# Patient Record
Sex: FEMALE | Race: BLACK OR AFRICAN AMERICAN
[De-identification: names, ages, dates, MRNs, and addresses within clinical notes are randomized per-mention and may not be internally consistent; named-entity substitution may affect disease eponyms.]

---

## 2019-06-28 ENCOUNTER — HOSPITAL ENCOUNTER (INPATIENT)
Dept: HOSPITAL 74 - JER | Age: 54
LOS: 2 days | Discharge: ANOTHER HOSPITAL | DRG: 142 | End: 2019-06-30
Attending: FAMILY MEDICINE | Admitting: FAMILY MEDICINE
Payer: COMMERCIAL

## 2019-06-28 VITALS — BODY MASS INDEX: 33.6 KG/M2

## 2019-06-28 DIAGNOSIS — I50.31: ICD-10-CM

## 2019-06-28 DIAGNOSIS — E11.9: ICD-10-CM

## 2019-06-28 DIAGNOSIS — I25.10: ICD-10-CM

## 2019-06-28 DIAGNOSIS — Z98.61: ICD-10-CM

## 2019-06-28 DIAGNOSIS — I05.0: ICD-10-CM

## 2019-06-28 DIAGNOSIS — D86.85: Primary | ICD-10-CM

## 2019-06-28 DIAGNOSIS — I48.0: ICD-10-CM

## 2019-06-28 DIAGNOSIS — D50.9: ICD-10-CM

## 2019-06-28 DIAGNOSIS — J44.9: ICD-10-CM

## 2019-06-28 DIAGNOSIS — I11.0: ICD-10-CM

## 2019-06-28 LAB
ALBUMIN SERPL-MCNC: 3.7 G/DL (ref 3.4–5)
ALP SERPL-CCNC: 74 U/L (ref 45–117)
ALT SERPL-CCNC: 32 U/L (ref 13–61)
ANION GAP SERPL CALC-SCNC: 8 MMOL/L (ref 8–16)
ANISOCYTOSIS BLD QL: (no result)
APTT BLD: 47.9 SECONDS (ref 25.2–36.5)
AST SERPL-CCNC: 23 U/L (ref 15–37)
BASOPHILS # BLD: 1.5 % (ref 0–2)
BILIRUB SERPL-MCNC: 0.7 MG/DL (ref 0.2–1)
BUN SERPL-MCNC: 10.8 MG/DL (ref 7–18)
CALCIUM SERPL-MCNC: 8.9 MG/DL (ref 8.5–10.1)
CHLORIDE SERPL-SCNC: 109 MMOL/L (ref 98–107)
CO2 SERPL-SCNC: 26 MMOL/L (ref 21–32)
CREAT SERPL-MCNC: 0.9 MG/DL (ref 0.55–1.3)
DEPRECATED RDW RBC AUTO: 24.2 % (ref 11.6–15.6)
EOSINOPHIL # BLD: 1.4 % (ref 0–4.5)
GLUCOSE SERPL-MCNC: 107 MG/DL (ref 74–106)
HCT VFR BLD CALC: 29.2 % (ref 32.4–45.2)
HGB BLD-MCNC: 8.9 GM/DL (ref 10.7–15.3)
INR BLD: 3.17 (ref 0.83–1.09)
LYMPHOCYTES # BLD: 8.1 % (ref 8–40)
MACROCYTES BLD QL: 0
MCH RBC QN AUTO: 19.2 PG (ref 25.7–33.7)
MCHC RBC AUTO-ENTMCNC: 30.4 G/DL (ref 32–36)
MCV RBC: 63.3 FL (ref 80–96)
MONOCYTES # BLD AUTO: 13.3 % (ref 3.8–10.2)
NEUTROPHILS # BLD: 75.7 % (ref 42.8–82.8)
PLATELET # BLD AUTO: 368 K/MM3 (ref 134–434)
PLATELET BLD QL SMEAR: NORMAL
PMV BLD: 8.5 FL (ref 7.5–11.1)
POTASSIUM SERPLBLD-SCNC: 4.3 MMOL/L (ref 3.5–5.1)
PROT SERPL-MCNC: 7.7 G/DL (ref 6.4–8.2)
PT PNL PPP: 37.8 SEC (ref 9.7–13)
RBC # BLD AUTO: 4.61 M/MM3 (ref 3.6–5.2)
SODIUM SERPL-SCNC: 142 MMOL/L (ref 136–145)
TARGETS BLD QL SMEAR: (no result)
WBC # BLD AUTO: 10.5 K/MM3 (ref 4–10)

## 2019-06-28 RX ADMIN — VARENICLINE TARTRATE SCH MG: 1 TABLET, FILM COATED ORAL at 23:38

## 2019-06-28 NOTE — PDOC
Rapid Medical Evaluation


Time Seen by Provider: 06/28/19 12:24


Medical Evaluation: 





06/28/19 12:24


I have performed a brief in-person evaluation of this patient.





The patient presents with a chief complaint of: short of breath


 


Pertinent physical exam findings:stable and in NAD, non-focal





I have ordered the following:labs, ekg





The patient will proceed to the ED for further evaluation.

## 2019-06-28 NOTE — PDOC
Documentation entered by Janeth Hoover SCRIBE, acting as scribe for 

Trena Knott MD.








Trena Knott MD:  This documentation has been prepared by the Sneha keating Mackenzie, SCRIBE, under my direction and personally reviewed by me 

in its entirety.  I confirm that the documentation accurately reflects all work

, treatment, procedures, and medical decision making performed by me.  





Attending Attestation





- Resident


Resident Name: Rudolph Sorto





- ED Attending Attestation


I have performed the following: I have examined & evaluated the patient, The 

case was reviewed & discussed with the resident, I agree w/resident's findings 

& plan, Exceptions are as noted





- HPI


HPI: 


The patient is a 54 year old female, with a significant PMH of CHF, HTN, DM, 

recent pneumonia (diagnosed 3 weeks ago), and 5 lymph nodes in her lungs, who 

presents to the emergency department with progressively worsening SOB and chest 

pain for 2 days. Patient reports she had been on at home oxygen treatments 

since being diagnosed with pneumonia 3 weeks ago. Last night however, despite 

administering her oxygen she had extreme difficulty breathing, sharp chest pain

, and a cough that kept her up all night. Patient also notes she has been told 

she needs an MVR. 





The patient denies headache and dizziness.


Denies fever, chills, nausea, vomiting, diarrhea and constipation.


Denies dysuria, frequency, urgency and hematuria.





Allergies: As per resident note


Past surgical history: As per resident note


Social history: None reported


PCP: Dr. Susy Luevano








- Physicial Exam


PE: 





GENERAL: Awake, alert, and fully oriented, in no acute distress


HEAD: No signs of trauma


EYES: PERRLA, EOMI, sclera anicteric, conjunctiva clear


ENT: Auricles normal inspection, hearing grossly normal, nares patent, 

oropharynx clear without exudates. Moist mucosa


NECK: Normal ROM, supple, no lymphadenopathy, JVD, or masses


LUNGS: Dec air entry at bases B/L


HEART: Regular rate and rhythm, normal S1 and S2, no murmurs, rubs or gallops


ABDOMEN: Soft, nontender, normoactive bowel sounds.  No guarding, no rebound.  

No masses


EXTREMITIES: Normal range of motion, no edema.  No clubbing or cyanosis. No 

cords, erythema, or tenderness


NEUROLOGICAL: Cranial nerves II through XII grossly intact.  Normal speech. 

Motor and sensation intact


SKIN: Warm, Dry, normal turgor, no rashes or lesions noted. 








- Medical Decision Making





Pt with history of rheumatic heart disease presenting with symptoms of heart 

failure. She is in the process of planning valve replacement, is supposed to 

get a cath at [facility] in the next few weeks. Case d/w her cardiologist, 

recommended lasix, then possibly transfer to [facility] as an inpatient if she needs 

the cath more urgently.

## 2019-06-28 NOTE — PDOC
History of Present Illness





- General


Chief Complaint: Shortness of Breath


Stated Complaint: DIFF BREATHING


Time Seen by Provider: 06/28/19 12:24





- History of Present Illness


Initial Comments: 





06/28/19 13:47


The patient is a 54 year old female with a history of HTN, HLD, DM, COPD, CHF, 

Bronchitis, who presents for evaluation of shortness of breath and chest pain.  

The patient reports a 2 day history of worsening shortness of breath worse with 

lying flat at night prompting her presentation to the ED for further 

evaluation.  She also notes intermittent bilateral poorly described chest pain.

  She notes that she was seen at [facility] 1 month ago and diagnosed with a 

pneumonia after a chest CTA.  She otherwise denies fevers, chills, nausea, 

vomiting, abdominal pain, or changes with urination or bowel movements.





Past History





- Past Medical History


Allergies/Adverse Reactions: 


 Allergies











Allergy/AdvReac Type Severity Reaction Status Date / Time


 


doxycycline Allergy   Verified 06/28/19 18:11


 


erythromycin base Allergy   Verified 06/28/19 18:11


 


Penicillins Allergy   Verified 06/28/19 18:11


 


tetracycline Allergy   Verified 06/28/19 18:11











Home Medications: 


Ambulatory Orders





Aspirin [ASA -] 81 mg PO DAILY 06/28/19 


Atorvastatin Ca [Lipitor] 40 mg PO HS 06/28/19 


Diazepam [Valium] 5 mg PO ONCE 06/28/19 


Gabapentin 300 mg PO DAILY 06/28/19 


Metoprolol Succinate 25 mg PO DAILY 06/28/19 


Omeprazole 20 mg PO DAILY 06/28/19 


Paroxetine HCl [Paxil -] 10 mg PO DAILY 06/28/19 


Sotalol HCl [Betapace AF] 80 mg PO DAILY 06/28/19 


Tiotropium Br/Olodaterol HCl [Stiolto Respimat Inhal Spray] 2.5 puff PO DAILY 06 /28/19 


Varenicline Tartrate [Chantix -] 10 mg PO DAILY 06/28/19 


Warfarin Sodium [Coumadin] 10 mg PO DAILY 06/28/19 











- Suicide/Smoking/Psychosocial Hx


Smoking History: Never smoked


Have you smoked in the past 12 months: No


Information on smoking cessation initiated: No


Hx Alcohol Use: No


Drug/Substance Use Hx: No





**Review of Systems





- Review of Systems


Comments:: 





06/28/19 13:55


Constitutional: No fevers, chills, fatigue, malaise


HEENT: No Rhinorrhea, nasal congestion, visual changes


Cardiovascular: Chest pain, Lightheadedness. No syncope, palpitations, 


Respiratory: SOB.  No Cough, Hemoptysis,


Gastrointestinal: No Abdominal pain, Nausea, Vomiting, Constipation, Diarrhea, 

Melena


Genitourinary: No Dysuria, Frequency, Urgency, Hesitancy, Hematuria, Flank pain


Musculoskeletal: No Myalgia, arthralgia


Skin: No rashes, itching, bruising, pallor


Neurologic: No Headache, Dizziness, Numbness, Weakness, or Tingling


Psychiatric: No Hallucinations. No SI or HI








*Physical Exam





- Vital Signs


 Last Vital Signs











Temp Pulse Resp BP Pulse Ox


 


 98.2 F   71   16   155/85   100 


 


 06/28/19 12:26  06/28/19 12:26  06/28/19 12:26  06/28/19 12:26  06/28/19 12:26














- Physical Exam


Comments: 





06/28/19 13:56


General Appearance: Nourished. No Apparent Distress


HEENT:  No Pharyngeal Erythema, Tonsillar Exudate, Tonsillar Erythema


Neck: No Cervical Lymphadenopathy


Respiratory/Chest: Decreased breath sounds at the bases bilaterally.  No 

Crackles, Rales, Rhonchi, Wheezing


Cardiovascular: Regular Rhythm, Regular Rate. Systolic murmur noted on exam.  

No Gallops, Rubs


Gastrointestinal/Abdominal: Normal Bowel Sounds, Soft. No Guarding, Rebound, 

Tenderness


Musculoskeletal: No CVA Tenderness


Extremity: Normal Capillary Refill


Integumentary: Normal Color, Dry, Warm


Neurologic: Fully Oriented, Alert, Normal Mood/Affect, Normal Response,








ED Treatment Course





- LABORATORY


CBC & Chemistry Diagram: 


 06/28/19 12:54





 06/28/19 12:27





Medical Decision Making





- Medical Decision Making





06/28/19 13:57


The patient is a 54 year old female with a history of HTN, HLD, DM, COPD, CHF, 

Bronchitis, who presents for evaluation of shortness of breath and chest pain.  

Differential includes but is not limited to: COPD, CHF, ACS, Infectious, 

Metabolic Derangement.  Given the patient's history and physical exam, we will 

obtain a cbc, cmp, troponin, bnp, coags, chest plain film, ekg to evaluate 

further.  We will continue to monitor and reassess while here in the ED.





06/28/19 16:50


CBC, cmp are unremarkable.  Troponin is unremarkable.  BNP is elevated to 

2000s.  Chest plain film demonstrates pulmonary congestion.  We discussed the 

case with the patient's cardiologist Dr. Stover who recommended admission for 

diuriesis.  We discussed the case with the covering cardiologist. Dr. Russell 

who has come to evaluate the patient.  We discussed the case with the admitting 

team who accepted the patient for admission.  We will treat the patient with 

lasix here in the ED.





*DC/Admit/Observation/Transfer


Diagnosis at time of Disposition: 


 SOB (shortness of breath)





CHF (congestive heart failure)


Qualifiers:


 Heart failure type: unspecified Heart failure chronicity: unspecified 

Qualified Code(s): I50.9 - Heart failure, unspecified








- Discharge Dispostion


Condition at time of disposition: Stable


Decision to Admit order: Yes





- Referrals





- Patient Instructions





- Post Discharge Activity

## 2019-06-28 NOTE — CON.CARD
Cardiology Consult (text)





- Consultation


Consultation Note: 





cc: sob





hpi:  54 f hx htn, hld, dchf, copd, cad s/p pci 2016, pafib, dm, mitral 

stenosis here with sob.  Pt was recently diagnosed with sev rheumatic MS with 

plans for outpt cath/valve surgery but over past several days has noticed worse 

voss.  Also with orthopnea and pnd.  No anginal cp, palps, dizzy, loc, le edema.

  In ER found with chf.  Sees dr vidal for cardio.





pmh: per hpi


psh: pci


social:  no tob


fam: no premature cad, scd


ros: per hpi; all others normal


meds:


 Current Medications











Generic Name Dose Route Start Last Admin





  Trade Name Freq  PRN Reason Stop Dose Admin


 


Furosemide  40 mg  06/29/19 10:00  





  Lasix Injection -  IVPUSH   





  DAILY RICHA   





     





     





     





     








pe:


 Vital Signs











 Period  Temp  Pulse  Resp  BP Sys/Cruz  Pulse Ox


 


 Last 24 Hr  98.2 F-98.2 F  70-71  16-16  155-155/85-85  








nad no jvd


rrr s1s2 no mrg


scattered rhonchi, nl eff


aaox3


no le e/c/c


abd nt nd pos bs


no jaundice diaphoresis


pos dp pt no carotid bruits





 Laboratory Last Values











WBC  10.5 K/mm3 (4.0-10.0)  H  06/28/19  12:54    


 


RBC  4.61 M/mm3 (3.60-5.2)   06/28/19  12:54    


 


Hgb  8.9 GM/dL (10.7-15.3)  L  06/28/19  12:54    


 


Hct  29.2 % (32.4-45.2)  L  06/28/19  12:54    


 


MCV  63.3 fl (80-96)  L  06/28/19  12:54    


 


MCH  19.2 pg (25.7-33.7)  L  06/28/19  12:54    


 


MCHC  30.4 g/dl (32.0-36.0)  L  06/28/19  12:54    


 


RDW  24.2 % (11.6-15.6)  H  06/28/19  12:54    


 


Plt Count  368 K/MM3 (134-434)   06/28/19  12:54    


 


MPV  8.5 fl (7.5-11.1)   06/28/19  12:54    


 


Absolute Neuts (auto)  7.9 K/mm3 (1.5-8.0)   06/28/19  12:54    


 


Neutrophils %  75.7 % (42.8-82.8)   06/28/19  12:54    


 


Neutrophils % (Manual)  55.2 % (42.8-82.8)   06/28/19  12:54    


 


Band Neutrophils %  0.0 %  06/28/19  12:54    


 


Lymphocytes %  8.1 % (8-40)   06/28/19  12:54    


 


Lymphocytes % (Manual)  34.4 % (8-40)   06/28/19  12:54    


 


Monocytes %  13.3 % (3.8-10.2)  H  06/28/19  12:54    


 


Monocytes % (Manual)  8 % (3.8-10.2)   06/28/19  12:54    


 


Eosinophils %  1.4 % (0-4.5)   06/28/19  12:54    


 


Eosinophils % (Manual)  1.1 % (0-4.5)   06/28/19  12:54    


 


Basophils %  1.5 % (0-2.0)   06/28/19  12:54    


 


Basophils % (Manual)  1.0 % (0-2.0)   06/28/19  12:54    


 


Myelocytes % (Man)  0 % (0-2)   06/28/19  12:54    


 


Promyelocytes % (Man)  0 % (0-2)   06/28/19  12:54    


 


Blast Cells % (Manual)  0 % (0-0)   06/28/19  12:54    


 


Nucleated RBC %  0 % (0-0)   06/28/19  12:54    


 


Metamyelocytes  0 % (0-2)   06/28/19  12:54    


 


Platelet Estimate  Normal   06/28/19  12:54    


 


Polychromasia  1+   06/28/19  12:54    


 


Poikilocytosis  1+   06/28/19  12:54    


 


Anisocytosis  2+   06/28/19  12:54    


 


Microcytosis  2+   06/28/19  12:54    


 


Macrocytosis  0   06/28/19  12:54    


 


Target Cells  1+   06/28/19  12:54    


 


Schistocytes  1+   06/28/19  12:54    


 


PT with INR  37.80 SEC (9.7-13.0)  H  06/28/19  12:54    


 


INR  3.17  (0.83-1.09)  H  06/28/19  12:54    


 


PTT (Actin FS)  47.9 SECONDS (25.2-36.5)  H  06/28/19  12:54    


 


Sodium  142 mmol/L (136-145)   06/28/19  12:27    


 


Potassium  4.3 mmol/L (3.5-5.1)   06/28/19  12:27    


 


Chloride  109 mmol/L ()  H  06/28/19  12:27    


 


Carbon Dioxide  26 mmol/L (21-32)   06/28/19  12:27    


 


Anion Gap  8 MMOL/L (8-16)   06/28/19  12:27    


 


BUN  10.8 mg/dL (7-18)   06/28/19  12:27    


 


Creatinine  0.9 mg/dL (0.55-1.3)   06/28/19  12:27    


 


Est GFR (CKD-EPI)AfAm  85.20   06/28/19  12:27    


 


Est GFR (CKD-EPI)NonAf  73.51   06/28/19  12:27    


 


Random Glucose  107 mg/dL ()  H  06/28/19  12:27    


 


Calcium  8.9 mg/dL (8.5-10.1)   06/28/19  12:27    


 


Total Bilirubin  0.7 mg/dL (0.2-1)   06/28/19  12:27    


 


AST  23 U/L (15-37)   06/28/19  12:27    


 


ALT  32 U/L (13-61)   06/28/19  12:27    


 


Alkaline Phosphatase  74 U/L ()   06/28/19  12:27    


 


Creatine Kinase  141 U/L ()   06/28/19  12:27    


 


Troponin I  < 0.02 ng/ml (0.00-0.05)   06/28/19  12:27    


 


B-Natriuretic Peptide  2014.1 pg/ml (5-125)  H  06/28/19  12:27    


 


Total Protein  7.7 g/dl (6.4-8.2)   06/28/19  12:27    


 


Albumin  3.7 g/dl (3.4-5.0)   06/28/19  12:27    








ecg: sr, no ischemic changes





cxr: chf








a/p:  54 f hx htn, hld, dchf, copd, cad s/p pci 2016, pafib, dm, mitral 

stenosis here with sob.





sob, acute diastolic chf, mitral stenosis:


-Pt was recently diagnosed with sev rheumatic MS with plans for outpt cath/

valve surgery but now presents with chf


-iv lasix 40 qd to start, daily chem7/weight


-d/[facility] p[facility] when resp status improved for cath and mitral valve eval





htn:


-cont home meds





cad s/p pci:


-no signs acs, continue rivera on tele


-cont ac





pafib:


-in sr now


-on coumadin, would hold for now and start hep gtt when INR<2 in preparation 

for cath/mv surgery at [facility]

## 2019-06-28 NOTE — CON.PULM
Consult


Consult Specialty:: PULMONARY


Referred by:: LANIE


Reason for Consultation:: SOB





- History of Present Illness


Chief Complaint: SOB/KEARNEY


History of Present Illness: 





54 AA FEMALE ADMITTED WITH KEARNEY OVER PAST 1-2 DAYS. SHE HAS A H/O RH MITRAL 

STENOSIS/CHF/COPD/ACTIVE SMOKING/PCI STENT 2016


HTM/PAF/DM/HPL STATES SHE HAD A 'CLOT " AND IS ON COUMADIN. SHE WASN'T SURE IF 

THE CLOT WAS IN THE LUNG OR LOWER EXT.


RECENT CTA CHEST DONE AT [facility] WAS SIGNIFICANT FOR MEDIASTINAL 

AND HILAR ADENOPATHY AND A MOSAIC PATTERN WHICH WAS THOUGHT TO BE DUE TO VOLUME 

OVERLOAD. SHE WAS WORKED UP RECENTLY AT [facility] AND HER MEDICAL TEAM IS THER 

BUT DECIDED TO COME TO [facility] .


DENIES CHEST PAIN/COUGH OR FEVER.





- History Source


History Provided By: Patient, Medical Record


Limitations to Obtaining History: No Limitations





- Past Medical History


CNS: No: Alzheimer's, CVA, Dementia, Migraine, Multiple Sclerosis, Peripheral 

Neuropathy, Parkinson's, Seizure, Syncope, TIA, Vertigo, Other


Cardio/Vascular: Yes: AFIB, CAD, CHF, HTN, Hyperlipdemia, Mitral Stenosis, 

Murmur


Pulmonary: Yes: COPD


Gastrointestinal: No: Ascites, Cancer, Constipation, Crohn's Disease, 

Diverticulitis, Diverticulosis, Esophageal Varices, Gastritis, GERD, GI Bleed, 

Hemorrhoids, Hiatal Hernia, Inflamatory Bowel Disease, Irritable Bowel Disease, 

Pancreatitis, Peptic Ulcer Disease, Ulcerative Colitis, Other


Hepatobiliary: No: Cirrhosis, Cholelithiasis, Cholecystitis, Choledocholithiasis

, Hepatitis A, Hepatitis B, Hepatitis C, Other


Reproductive: Yes: Postmenopausal


Heme/Onc: Yes: Anemia (PCI STENT)





- Alcohol/Substance Use


Hx Alcohol Use: No





- Smoking History


Smoking history: Never smoked


Have you smoked in the past 12 months: No





- Social History


ADL: Independent


Place of Birth: [place]


History of Recent Travel: Yes





Home Medications





- Allergies


Allergies/Adverse Reactions: 


 Allergies











Allergy/AdvReac Type Severity Reaction Status Date / Time


 


doxycycline Allergy   Verified 06/28/19 18:11


 


erythromycin base Allergy   Verified 06/28/19 18:11


 


Penicillins Allergy   Verified 06/28/19 18:11


 


tetracycline Allergy   Verified 06/28/19 18:11














- Home Medications


Home Medications: 


Ambulatory Orders





Aspirin [ASA -] 81 mg PO DAILY 06/28/19 


Atorvastatin Ca [Lipitor] 40 mg PO HS 06/28/19 


Diazepam [Valium] 5 mg PO ONCE 06/28/19 


Gabapentin 300 mg PO DAILY 06/28/19 


Metoprolol Succinate 25 mg PO DAILY 06/28/19 


Omeprazole 20 mg PO DAILY 06/28/19 


Paroxetine HCl [Paxil -] 10 mg PO DAILY 06/28/19 


Sotalol HCl [Betapace AF] 80 mg PO DAILY 06/28/19 


Tiotropium Br/Olodaterol HCl [Stiolto Respimat Inhal Spray] 2.5 puff PO DAILY 06 /28/19 


Varenicline Tartrate [Chantix -] 10 mg PO DAILY 06/28/19 


Warfarin Sodium [Coumadin] 10 mg PO DAILY 06/28/19 











Review of Systems





- Review of Systems


Cardiovascular: reports: Shortness of Breath


Respiratory: reports: Exercise Intolerance, Orthopnea, SOB on Exertion


Gastrointestinal: reports: No Symptoms


Genitourinary: reports: No Symptoms


Breasts: reports: No Symptoms Reported





Physical Exam


Vital Sings: 


 Vital Signs











Temperature  98.2 F   06/28/19 12:26


 


Pulse Rate  71   06/28/19 12:26


 


Respiratory Rate  16   06/28/19 12:26


 


Blood Pressure  155/85   06/28/19 12:26


 


O2 Sat by Pulse Oximetry (%)  100   06/28/19 12:26











Constitutional: Yes: Calm


Eyes: Yes: EOM Intact


HENT: Yes: Normocephalic


Neck: Yes: Trachea Midline


Cardiovascular: Yes: Regular Rate and Rhythm, S1, S2


Respiratory: Yes: CTA Bilaterally


Gastrointestinal: Yes: Normal Bowel Sounds


Edema: No


Neurological: Yes: Alert


Psychiatric: Yes: Alert


Labs: 


 CBC, BMP





 06/28/19 12:54 





 06/28/19 12:27 





REST REVIEWED





Imaging





- Results


X-ray: Report Reviewed, Image Reviewed


EKG: Report Reviewed





Problem List





- Problems


(1) CHF (congestive heart failure)


Code(s): I50.9 - HEART FAILURE, UNSPECIFIED   


Qualifiers: 


   Heart failure type: unspecified   Heart failure chronicity: unspecified   

Qualified Code(s): I50.9 - Heart failure, unspecified   





(2) SOB (shortness of breath)


Code(s): R06.02 - SHORTNESS OF BREATH   





Assessment/Plan





54 AA WITH CHF/MITRAL STENOSIS/COPD/HTN/HPL/PAF/PCI STENT/ ?VTE IN PAST/ON 

WARFARIN


PRESENTS WITH WORSENING HEART FAILURE


OF NOTE IS HILAR AND MEDIASTINAL ADENOPATHY SEEN ON RECENT CHEST IMAGING OF 

UNKNOWN ETIOLOGY ? SARCOID ? SARCOID MYOCARDITIS


PATIENT IS PLANNED FOR A RIGHT/LEFT HEART CATH AND POSSIBLE VALVE REPAIR


CONTINUE CURRENT TREATMENT PLAN AS PER CARDIOLOGY


TRY TO OBTAIN WORKUP ALREADY DONE AS NOT TO DUPLICATE TESTING





WILL FOLLOW





SOMMER BOLDEN MD

## 2019-06-29 LAB
ALBUMIN SERPL-MCNC: 3.4 G/DL (ref 3.4–5)
ALP SERPL-CCNC: 69 U/L (ref 45–117)
ALT SERPL-CCNC: 27 U/L (ref 13–61)
ANION GAP SERPL CALC-SCNC: 7 MMOL/L (ref 8–16)
ANISOCYTOSIS BLD QL: (no result)
AST SERPL-CCNC: 15 U/L (ref 15–37)
BILIRUB SERPL-MCNC: 0.6 MG/DL (ref 0.2–1)
BNP SERPL-MCNC: 800.1 PG/ML (ref 5–125)
BUN SERPL-MCNC: 12 MG/DL (ref 7–18)
CALCIUM SERPL-MCNC: 9.1 MG/DL (ref 8.5–10.1)
CHLORIDE SERPL-SCNC: 108 MMOL/L (ref 98–107)
CO2 SERPL-SCNC: 27 MMOL/L (ref 21–32)
CREAT SERPL-MCNC: 0.8 MG/DL (ref 0.55–1.3)
DACRYOCYTES BLD QL SMEAR: (no result)
DEPRECATED RDW RBC AUTO: 24.4 % (ref 11.6–15.6)
GLUCOSE SERPL-MCNC: 93 MG/DL (ref 74–106)
HCT VFR BLD CALC: 27.7 % (ref 32.4–45.2)
HGB BLD-MCNC: 8.4 GM/DL (ref 10.7–15.3)
INR BLD: 3.38 (ref 0.83–1.09)
MACROCYTES BLD QL: (no result)
MAGNESIUM SERPL-MCNC: 1.9 MG/DL (ref 1.8–2.4)
MCH RBC QN AUTO: 19.1 PG (ref 25.7–33.7)
MCHC RBC AUTO-ENTMCNC: 30.3 G/DL (ref 32–36)
MCV RBC: 63 FL (ref 80–96)
PHOSPHATE SERPL-MCNC: 4.6 MG/DL (ref 2.5–4.9)
PLATELET # BLD AUTO: 343 K/MM3 (ref 134–434)
PLATELET BLD QL SMEAR: NORMAL
PMV BLD: 9.3 FL (ref 7.5–11.1)
POTASSIUM SERPLBLD-SCNC: 4.1 MMOL/L (ref 3.5–5.1)
PROT SERPL-MCNC: 7.1 G/DL (ref 6.4–8.2)
PT PNL PPP: 40.4 SEC (ref 9.7–13)
RBC # BLD AUTO: 4.4 M/MM3 (ref 3.6–5.2)
SODIUM SERPL-SCNC: 142 MMOL/L (ref 136–145)
TARGETS BLD QL SMEAR: (no result)
WBC # BLD AUTO: 6.9 K/MM3 (ref 4–10)

## 2019-06-29 RX ADMIN — GABAPENTIN SCH MG: 300 CAPSULE ORAL at 22:10

## 2019-06-29 RX ADMIN — FUROSEMIDE SCH MG: 10 INJECTION, SOLUTION INTRAVENOUS at 17:05

## 2019-06-29 RX ADMIN — PANTOPRAZOLE SODIUM SCH MG: 20 TABLET, DELAYED RELEASE ORAL at 09:24

## 2019-06-29 RX ADMIN — VARENICLINE TARTRATE SCH MG: 1 TABLET, FILM COATED ORAL at 21:40

## 2019-06-29 RX ADMIN — SOTALOL HYDROCHLORIDE SCH MG: 80 TABLET ORAL at 21:40

## 2019-06-29 RX ADMIN — ASPIRIN 81 MG SCH MG: 81 TABLET ORAL at 10:36

## 2019-06-29 RX ADMIN — VARENICLINE TARTRATE SCH MG: 1 TABLET, FILM COATED ORAL at 10:36

## 2019-06-29 NOTE — PN
Progress Note, Physician


History of Present Illness: 





PULMONARY





ALERT,FEELING BETTER,LESS DYSPNEIC





- Current Medication List


Current Medications: 


Active Medications





Aspirin (Asa -)  81 mg PO DAILY [facility]


Atorvastatin Calcium (Lipitor -)  40 mg PO HS [facility]


Furosemide (Lasix Injection -)  40 mg IVPUSH DAILY [facility]


   Last Admin: 06/29/19 09:24 Dose:  40 mg


Gabapentin (Neurontin -)  300 mg PO DAILY [facility]


   Last Admin: 06/29/19 09:24 Dose:  300 mg


Metoprolol Succinate (Toprol Xl -)  25 mg PO DAILY [facility]


   Last Admin: 06/29/19 09:24 Dose:  25 mg


Pantoprazole Sodium (Protonix -)  20 mg PO DAILY [facility]


   Last Admin: 06/29/19 09:24 Dose:  20 mg


Paroxetine HCl (Paxil -)  10 mg PO DAILY [facility]


   Last Admin: 06/29/19 09:24 Dose:  10 mg


Sotalol HCl (Betapace -)  80 mg PO DAILY [facility]


   Last Admin: 06/29/19 09:24 Dose:  80 mg


Varenicline (Chantix -)  1 mg PO BID [facility]


   Last Admin: 06/28/19 23:38 Dose:  1 mg


Warfarin Sodium (Coumadin -)  10 mg PO DAILY@1800 [facility]











- Objective


Vital Signs: 


 Vital Signs











Temperature  98.8 F   06/29/19 05:45


 


Pulse Rate  78   06/29/19 05:45


 


Respiratory Rate  18   06/29/19 08:35


 


Blood Pressure  131/80   06/29/19 05:45


 


O2 Sat by Pulse Oximetry (%)  94 L  06/29/19 08:35











Constitutional: Yes: Well Nourished, Calm


Eyes: Yes: WNL


HENT: Yes: WNL


Neck: Yes: WNL


Cardiovascular: Yes: Regular Rate and Rhythm, S1, S2


Respiratory: Yes: Rales (BIBASILAR CRACKLES)


Gastrointestinal: Yes: Normal Bowel Sounds, Soft


Extremities: Yes: WNL


Edema: No


Labs: 


 CBC, BMP





 06/29/19 06:14 





 06/29/19 06:14 





 INR, PTT











INR  3.38  (0.83-1.09)  H  06/29/19  06:14    














Assessment/Plan





Problem List





- Problems


(1) CHF (congestive heart failure)


Code(s): I50.9 - HEART FAILURE, UNSPECIFIED   


Qualifiers: 


   Heart failure type: unspecified   Heart failure chronicity: unspecified   

Qualified Code(s): I50.9 - Heart failure, unspecified   





(2) SOB (shortness of breath)


Code(s): R06.02 - SHORTNESS OF BREATH   





Assessment/Plan





54 AA WITH CHF/MITRAL STENOSIS/COPD/HTN/HPL/PAF/PCI STENT/ ?VTE IN PAST/ON 

WARFARIN


ACUTE ON CHRONIC CONGESTIVE HEART FAILURE


HILAR AND MEDIASTINAL ADENOPATHY SEEN ON RECENT CHEST IMAGING OF UNKNOWN 

ETIOLOGY ? SARCOID ? SARCOID MYOCARDITIS


PATIENT IS PLANNED FOR A RIGHT/LEFT HEART CATH AND POSSIBLE VALVE REPAIR


CONTINUE LASIX


F/U CHEST X-RAYS


DAILY WT








DR SCHWARTZ

## 2019-06-29 NOTE — CONSULT
Consult


Consult Specialty:: Heme


Referred by:: Dr. England


Reason for Consultation:: Anemia





- History of Present Illness


Chief Complaint: SOB


History of Present Illness: 


54F, smoker, with HTN, DM, COPD, atrial fibrillation on warfarin, mitral 

stenosis, CAD s/p stent in 2016,CHF, admission for PNA at OSH 1 month ago 

admitted with chest pain and SOB. Being treated for ADHF. Found to have hilar 

and mediastinal LAD on recent chest imaging. Planned for right/left heart cath 

and possible valve repair. Pulmonology considering sarcoid as etiology of LAD. 

Hematology consulted for anemia. Hgb 8.9, MCV 62, RBC ount normal. No prior 

labs. WBC, plt count normal. B12 normal. Ferritn 61, rest of iron studies 

pending.





Pt has been told that she is anemic in the past and has been PO iron at some 

point but could not tolerate due to stomach upset. No menstrual periods since 

age 34 (stopped on their own). Never had colonoscopy. No melena, hematochezia. 

Reports that pt's mother and children also have anemia but does not know 

etiology.








- Past Medical History


CNS: No: Alzheimer's, CVA, Dementia, Migraine, Multiple Sclerosis, Peripheral 

Neuropathy, Parkinson's, Seizure, Syncope, TIA, Vertigo, Other


Cardio/Vascular: Yes: AFIB, CAD, CHF, HTN, Hyperlipdemia, Mitral Stenosis, 

Murmur


Pulmonary: Yes: COPD


Gastrointestinal: No: Ascites, Cancer, Constipation, Crohn's Disease, 

Diverticulitis, Diverticulosis, Esophageal Varices, Gastritis, GERD, GI Bleed, 

Hemorrhoids, Hiatal Hernia, Inflamatory Bowel Disease, Irritable Bowel Disease, 

Pancreatitis, Peptic Ulcer Disease, Ulcerative Colitis, Other


Hepatobiliary: No: Cirrhosis, Cholelithiasis, Cholecystitis, Choledocholithiasis

, Hepatitis A, Hepatitis B, Hepatitis C, Other





- Alcohol/Substance Use


Hx Alcohol Use: No





- Smoking History


Smoking history: Never smoked


Have you smoked in the past 12 months: No





- Social History


ADL: Independent


History of Recent Travel: Yes





Home Medications





- Allergies


Allergies/Adverse Reactions: 


 Allergies











Allergy/AdvReac Type Severity Reaction Status Date / Time


 


doxycycline Allergy   Verified 06/28/19 18:11


 


erythromycin base Allergy   Verified 06/28/19 18:11


 


Penicillins Allergy   Verified 06/28/19 18:11


 


tetracycline Allergy   Verified 06/28/19 18:11














- Home Medications


Home Medications: 


Ambulatory Orders





Aspirin [ASA -] 81 mg PO DAILY 06/28/19 


Atorvastatin Ca [Lipitor] 40 mg PO HS 06/28/19 


Diazepam [Valium] 5 mg PO ONCE 06/28/19 


Gabapentin 300 mg PO DAILY 06/28/19 


Metoprolol Succinate 25 mg PO DAILY 06/28/19 


Omeprazole 20 mg PO DAILY 06/28/19 


Paroxetine HCl [Paxil -] 10 mg PO DAILY 06/28/19 


Sotalol HCl [Betapace AF] 80 mg PO DAILY 06/28/19 


Tiotropium Br/Olodaterol HCl [Stiolto Respimat Inhal Spray] 2.5 puff PO DAILY 06 /28/19 


Varenicline Tartrate [Chantix -] 10 mg PO DAILY 06/28/19 


Warfarin Sodium [Coumadin] 10 mg PO DAILY 06/28/19 











Review of Systems





- Review of Systems


Constitutional: reports: No Symptoms


Cardiovascular: reports: No Symptoms, Shortness of Breath


Respiratory: reports: No Symptoms, SOB, SOB on Exertion


Gastrointestinal: reports: No Symptoms.  denies: Abdominal Pain, Melena, Rectal 

Bleeding, Vomiting


Hematology/Lymphatic: reports: No Symptoms.  denies: Easily Bruised, Excessive 

Bleeding





Physical Exam


Vital Signs: 


 Vital Signs











Temperature  98.6 F   06/29/19 17:06


 


Pulse Rate  73   06/29/19 17:06


 


Respiratory Rate  18   06/29/19 17:06


 


Blood Pressure  114/67   06/29/19 17:06


 


O2 Sat by Pulse Oximetry (%)  94 L  06/29/19 08:35











Constitutional: Yes: Well Nourished, No Distress, Calm


Eyes: Yes: Conjunctiva Clear


Cardiovascular: Yes: Regular Rate and Rhythm


Respiratory: Yes: Regular, CTA Bilaterally


Gastrointestinal: Yes: Soft.  No: Tenderness


Labs: 


 CBC, BMP





 06/29/19 06:14 





 06/29/19 06:14 











Assessment/Plan


Differential of significantly microcytic anemia is generally limited to iron 

deficiency and thalassemia. Ferritin normal but does not rule out iron 

deficiency. Rest of iron studies (pending) may help clarify.


Please check retics, send hemoglobin electrophoresis. Suspect alpha thal given 

normal RBC. Would send alpha globin mutation as well

## 2019-06-29 NOTE — PN
Progress Note, Physician


Chief Complaint: 





sob


History of Present Illness: 





sob when washing up this am


urinating a lot with lasix


no cp, palpit, syncope





no cigs





- Current Medication List


Current Medications: 


Active Medications





Aspirin (Asa -)  81 mg PO DAILY [facility]


Atorvastatin Calcium (Lipitor -)  40 mg PO HS [facility]


Furosemide (Lasix Injection -)  [facility]USH [facility]


Gabapentin (Neurontin -)  300 mg PO [facility]ILY [facility]


Metoprolol Succinate (Toprol Xl -)  25 mg PO DAILY [facility]


Pantoprazole Sodium (Protonix -)  20 mg PO DAILY [facility]


Paroxetine HCl (Paxil -)  10 mg PO DAILY [facility]


Sotalol HCl (Betapace -)  80 mg PO DAILY [facility]


Varenicline (Chantix -)  1 mg PO BID [facility]


   Last Admin: 06/28/19 23:38 Dose:  1 mg


Warfarin Sodium (Coumadin -)  10 mg PO DAILY@1800 [facility]











- Objective


Vital Signs: 


 Vital Signs











Temperature  98.8 F   06/29/19 05:45


 


Pulse Rate  78   06/29/19 05:45


 


Respiratory Rate  18   06/29/19 05:45


 


Blood Pressure  131/80   06/29/19 05:45


 


O2 Sat by Pulse Oximetry (%)  94 L  06/28/19 22:00











Constitutional: Yes: No Distress, Calm


Eyes: No: Sclera Icterus


HENT: No: Nasal Congestion


Cardiovascular: Yes: Regular Rate and Rhythm, S1, S2, Other (PMI non diplaced).

  No: JVD, Gallop, Murmur


Respiratory: Yes: CTA Bilaterally.  No: Accessory Muscle Use, Rales, Wheezes


Gastrointestinal: Yes: Normal Bowel Sounds, Soft.  No: Tenderness


Musculoskeletal: Yes: Other (No kyphosis)


Extremities: No: Cyanosis


Edema: No


Integumentary: No: Jaundice


Neurological: Yes: Alert, Oriented (x3)


Psychiatric: No: Agitated


Labs: 


 CBC, BMP





 06/29/19 06:14 





 INR, PTT











INR  3.38  (0.83-1.09)  H  06/29/19  06:14    














Assessment/Plan





ecg: sr, no ischemic changes, long QT (qtc > 500)





cxr: chf





tele: SR, artifact














a/p:  54 f hx htn, hld, dchf, copd, cad s/p pci 2016, pafib, dm, mitral 

stenosis here with sob.





sob, acute diastolic chf, mitral stenosis:


-Pt was recently diagnosed with sev rheumatic MS with plans for outpt cath/

valve surgery but now presents with chf


-iv lasix 40 qd started here, good subjective diuresis. wt down 1 lb, still 

signif sob with activity. incr lasix 40 iv bid.


-BNP 2K-->800


-d/w with pts cardio dr vidal and plan will be to [facility] and transfer to 

[facility] when resp status improved for cath and mitral valve eval





pafib:


-in sr now


-pt confirms correct home dose of sotalol is 80 bid, and metoprolol 25 

continued she says since her prior PCI.


-not liam here.


-QTC > 500, K/Mag good--aggressive repletion.


-rpt ECG. if QT > 500 will have to stop sotalol (even though this low dose is 

rarely QT prolonging)


-holding coumadin in anticipation of cath/MV surgery.


-trend INR, start hep gtt when INR <2 





htn:


-bp controlled


-cont home meds





cad s/p pci (2016):


-no signs acs, continue rivera on tele


-cont ac plus aspirin per dr vidal prior tx plan

## 2019-06-29 NOTE — PN
Progress Note, Physician


History of Present Illness: 





54 f hx htn, hld, dchf, copd, cad s/p pci 2016, pafib, dm, mitral stenosis here 

with sob.





- Current Medication List


Current Medications: 


Active Medications





Aspirin (Asa -)  81 mg PO DAILY [facility]


Atorvastatin Calcium (Lipitor -)  40 mg PO HS [facility]


Furosemide (Lasix Injection -)  40 mg IVPUSH DAILY [facility]


Gabapentin (Neurontin -)  300 mg PO DAILY [facility]


Metoprolol Succinate (Toprol Xl -)  25 mg PO DAILY [facility]


Pantoprazole Sodium (Protonix -)  20 mg PO DAILY [facility]


Paroxetine HCl (Paxil -)  10 mg PO DAILY [facility]


Sotalol HCl (Betapace -)  80 mg PO DAILY [facility]


Varenicline (Chantix -)  1 mg PO BID [facility]


   Last Admin: 06/28/19 23:38 Dose:  1 mg


Warfarin Sodium (Coumadin -)  10 mg PO DAILY@1800 [facility]











- Objective


Vital Signs: 


 Vital Signs











Temperature  98.8 F   06/29/19 05:45


 


Pulse Rate  78   06/29/19 05:45


 


Respiratory Rate  18   06/29/19 08:35


 


Blood Pressure  131/80   06/29/19 05:45


 


O2 Sat by Pulse Oximetry (%)  94 L  06/29/19 08:35











Cardiovascular: Yes: Murmur, S1, S2


Respiratory: Yes: Regular, CTA Bilaterally


Gastrointestinal: Yes: Normal Bowel Sounds, Soft.  No: Tenderness


Labs: 


 CBC, BMP





 06/29/19 06:14 





 06/29/19 06:14 





 INR, PTT











INR  3.38  (0.83-1.09)  H  06/29/19  06:14    














Problem List





- Problems


(1) CHF (congestive heart failure)


Assessment/Plan: 


IV LASIX


MONITOR LABS


Code(s): I50.9 - HEART FAILURE, UNSPECIFIED   


Qualifiers: 


   Heart failure type: unspecified   Heart failure chronicity: unspecified   

Qualified Code(s): I50.9 - Heart failure, unspecified   





(2) Mitral valve stenosis


Assessment/Plan: 


CARDIO ON BOARD


WILL NEED CATH AND POSSIBLE MV SURGERY --TRANSFER TO [facility]


Code(s): I05.0 - RHEUMATIC MITRAL STENOSIS   





(3) SOB (shortness of breath)


Assessment/Plan: 


AS ABOVE


Code(s): R06.02 - SHORTNESS OF BREATH   





(4) CAD (coronary artery disease)


Assessment/Plan: 


Orders





06/29/19 10:00


Aspirin [ASA -]   81 mg PO DAILY 


Metoprolol Succinate [Toprol Xl -]   25 mg PO DAILY 





06/29/19 22:00


Atorvastatin Ca [Lipitor -]   40 mg PO HS 











Code(s): I25.10 - ATHSCL HEART DISEASE OF NATIVE CORONARY ARTERY W/O ANG PCTRS 

  





(5) COPD (chronic obstructive pulmonary disease)


Code(s): J44.9 - CHRONIC OBSTRUCTIVE PULMONARY DISEASE, UNSPECIFIED   





(6) Anemia


Assessment/Plan: 


W/U ORDERED


HEM CONSULT


Code(s): D64.9 - ANEMIA, UNSPECIFIED

## 2019-06-30 VITALS — SYSTOLIC BLOOD PRESSURE: 105 MMHG | DIASTOLIC BLOOD PRESSURE: 61 MMHG | HEART RATE: 70 BPM | TEMPERATURE: 98.5 F

## 2019-06-30 LAB
ANION GAP SERPL CALC-SCNC: 8 MMOL/L (ref 8–16)
BUN SERPL-MCNC: 17.5 MG/DL (ref 7–18)
CALCIUM SERPL-MCNC: 9 MG/DL (ref 8.5–10.1)
CHLORIDE SERPL-SCNC: 106 MMOL/L (ref 98–107)
CO2 SERPL-SCNC: 28 MMOL/L (ref 21–32)
CREAT SERPL-MCNC: 0.9 MG/DL (ref 0.55–1.3)
GLUCOSE SERPL-MCNC: 97 MG/DL (ref 74–106)
INR BLD: 2.34 (ref 0.83–1.09)
MAGNESIUM SERPL-MCNC: 2.2 MG/DL (ref 1.8–2.4)
POTASSIUM SERPLBLD-SCNC: 4.2 MMOL/L (ref 3.5–5.1)
PT PNL PPP: 27.9 SEC (ref 9.7–13)
SERUM IRON SATURATION: 7 % (ref 15–55)
SODIUM SERPL-SCNC: 142 MMOL/L (ref 136–145)
TIBC SERPL-MCNC: 456 UG/DL (ref 250–450)

## 2019-06-30 RX ADMIN — FUROSEMIDE SCH MG: 10 INJECTION, SOLUTION INTRAVENOUS at 09:42

## 2019-06-30 RX ADMIN — GABAPENTIN SCH MG: 300 CAPSULE ORAL at 13:34

## 2019-06-30 RX ADMIN — VARENICLINE TARTRATE SCH MG: 1 TABLET, FILM COATED ORAL at 09:43

## 2019-06-30 RX ADMIN — GABAPENTIN SCH MG: 300 CAPSULE ORAL at 05:29

## 2019-06-30 RX ADMIN — SOTALOL HYDROCHLORIDE SCH MG: 80 TABLET ORAL at 09:42

## 2019-06-30 RX ADMIN — PANTOPRAZOLE SODIUM SCH MG: 20 TABLET, DELAYED RELEASE ORAL at 09:42

## 2019-06-30 RX ADMIN — ASPIRIN 81 MG SCH MG: 81 TABLET ORAL at 09:42

## 2019-06-30 NOTE — PN
Progress Note, Physician


History of Present Illness: 





pulmonary








alert,feeling better,less dyspneic,-cp





- Current Medication List


Current Medications: 


Active Medications





Aspirin (Asa -)  81 mg PO DAILY [facility]


   Last Admin: 06/30/19 09:42 Dose:  81 mg


Atorvastatin Calcium (Lipitor -)  40 mg PO HS [facility]


   Last Admin: 06/29/19 21:40 Dose:  40 mg


Furosemide (Lasix Injection -)  40 mg IVPUSH BIDISORDIL [facility]


   Last Admin: 06/30/19 09:42 Dose:  40 mg


Gabapentin (Neurontin -)  300 mg PO TID [facility]


   Last Admin: 06/30/19 05:29 Dose:  300 mg


Metoprolol Succinate (Toprol Xl -)  25 mg PO DAILY [facility]


   Last Admin: 06/30/19 09:42 Dose:  25 mg


Pantoprazole Sodium (Protonix -)  20 mg PO DAILY [facility]


   Last Admin: 06/30/19 09:42 Dose:  20 mg


Paroxetine HCl (Paxil -)  10 mg PO DAILY [facility]


   Last Admin: 06/30/19 09:42 Dose:  10 mg


Sotalol HCl (Betapace -)  80 mg PO BID [facility]


   Last Admin: 06/30/19 09:42 Dose:  80 mg


Varenicline (Chantix -)  1 mg PO BID [facility]


   Last Admin: 06/30/19 09:43 Dose:  1 mg


Warfarin Sodium (Coumadin -)  10 mg PO DAILY@1800 [facility]











- Objective


Vital Signs: 


 Vital Signs











Temperature  98.4 F   06/30/19 09:47


 


Pulse Rate  75   06/30/19 09:47


 


Respiratory Rate  18   06/30/19 09:47


 


Blood Pressure  123/65   06/30/19 09:47


 


O2 Sat by Pulse Oximetry (%)  95   06/30/19 08:04











Constitutional: Yes: Well Nourished, Calm


Eyes: Yes: WNL


HENT: Yes: WNL


Neck: Yes: WNL


Cardiovascular: Yes: Regular Rate and Rhythm, S1, S2


Respiratory: Yes: Rales (bibasilar rales)


Gastrointestinal: Yes: Normal Bowel Sounds, Soft


Extremities: Yes: WNL


Edema: No


Labs: 


 CBC, BMP





 06/30/19 06:05 





 INR, PTT











INR  2.34  (0.83-1.09)  H  06/30/19  07:38    














Assessment/Plan





Problem List





- Problems


(1) CHF (congestive heart failure)


Code(s): I50.9 - HEART FAILURE, UNSPECIFIED   


Qualifiers: 


   Heart failure type: unspecified   Heart failure chronicity: unspecified   

Qualified Code(s): I50.9 - Heart failure, unspecified   





(2) SOB (shortness of breath)


Code(s): R06.02 - SHORTNESS OF BREATH   





Assessment/Plan





54 AA WITH CHF/MITRAL STENOSIS/COPD/HTN/HPL/PAF/PCI STENT/ ?VTE IN PAST/ON 

WARFARIN


ACUTE ON CHRONIC CONGESTIVE HEART FAILURE


HILAR AND MEDIASTINAL ADENOPATHY SEEN ON RECENT CHEST IMAGING OF UNKNOWN 

ETIOLOGY ? SARCOID ? SARCOID MYOCARDITIS


PATIENT IS PLANNED FOR A RIGHT/LEFT HEART CATH AND POSSIBLE VALVE REPAIR


CONTINUE LASIX


F/U CHEST X-RAYS


DAILY WT








DR SCHWARTZ

## 2019-06-30 NOTE — PN
Progress Note, Physician


Chief Complaint: 





sob


History of Present Illness: 





signif urine output to lasix


no more sob going from bed to BR and washing up.


has not ambulated in halls.





no cp, palpit, presyncope





- Current Medication List


Current Medications: 


Active Medications





Aspirin (Asa -)  81 mg PO DAILY [facility]


   Last Admin: 06/29/19 10:36 Dose:  81 mg


Atorvastatin Calcium (Lipitor -)  40 mg PO HS [facility]


   Last Admin: 06/29/19 21:40 Dose:  40 mg


Furosemide (Lasix Injection -)  40 mg IVPUSH BIDISORDIL [facility]


   Last Admin: 06/29/19 17:05 Dose:  40 mg


Gabapentin (Neurontin -)  300 mg PO TID [facility]


   Last Admin: 06/30/19 05:29 Dose:  300 mg


Metoprolol Succinate (Toprol Xl -)  25 mg PO DAILY [facility]


   Last Admin: 06/29/19 09:24 Dose:  25 mg


Pantoprazole Sodium (Protonix -)  20 mg PO DAILY [facility]


   Last Admin: 06/29/19 09:24 Dose:  20 mg


Paroxetine HCl (Paxil -)  10 mg PO DAILY [facility]


   Last Admin: 06/29/19 09:24 Dose:  10 mg


Sotalol HCl (Betapace -)  80 mg PO BID [facility]


   Last Admin: 06/29/19 21:40 Dose:  80 mg


Varenicline (Chantix -)  1 mg PO BID [facility]


   Last Admin: 06/29/19 21:40 Dose:  1 mg


Warfarin Sodium (Coumadin -)  10 mg PO DAILY@1800 [facility]











- Objective


Vital Signs: 


 Vital Signs











Temperature  98.1 F   06/30/19 08:04


 


Pulse Rate  69   06/30/19 08:04


 


Respiratory Rate  18   06/30/19 08:04


 


Blood Pressure  125/65   06/30/19 08:04


 


O2 Sat by Pulse Oximetry (%)  95   06/30/19 08:04











Constitutional: Yes: Well Nourished, No Distress, Calm


Cardiovascular: Yes: Regular Rate and Rhythm, S1, S2.  No: Gallop, Murmur


Respiratory: Yes: Regular, CTA Bilaterally.  No: Accessory Muscle Use, Rales, 

Wheezes


Extremities: No: Cold


Edema: No


Neurological: Yes: Alert, Oriented


Psychiatric: No: Agitated


Labs: 


 CBC, BMP





 06/29/19 06:14 





 06/30/19 06:05 





 INR, PTT











INR  3.38  (0.83-1.09)  H  06/29/19  06:14    














Assessment/Plan





ecg: sr, no ischemic changes, long QT (qtc > 500)





cxr: chf





tele: SR, artifact, no PVCs














a/p:  54 f hx htn, hld, dchf, copd, cad s/p pci 2016, pafib, dm, mitral 

stenosis here with sob.





sob, acute diastolic chf, mitral stenosis:


-Pt was recently diagnosed with sev rheumatic MS with plans for outpt cath/

valve surgery but now presents with chf


-6/30: lasix incr'd to 40 bid due to ongoing NYHA III sx's. wt down further, 

renal fxn stable. sob improved. continue same regimen


-BNP 2K-->800


-she appears well-compensated and likely euvolemic. will arrange transfer to 

[facility] per dr vidal (dr flores service) for cath and w/u of MV intervention. (

results of anemia w/u can be followed up from there)





pafib:


-in sr now


-pt confirms correct home dose of sotalol is 80 bid, and metoprolol 25 

continued she says since her prior PCI.


-not liam here.


-QTC > 500, K/Mag good--aggressive repletion. rpt ecg pending


-cont tele monitoring re: pvc's with R-on-T risk


-given plans to transfer to [facility] currently, and tele benign with no freq PVCs/

risk of R-on-T, and prior h/o rapid AF unknown, and pt hi risk for acute pulm 

edema with rapid AF in setting of mitral stenosis, will cont sotalol for now to 

avoid rapid AF complicating definitive MV treatment--will plan to obtain EP 

input at [facility]


-holding coumadin in anticipation of invasive cardiac procedures


-trend INR, start hep gtt when INR <2 





htn:


-bp controlled


-cont home meds





cad s/p pci (2016):


-no signs acs, continue rivera on tele


-cont ac plus aspirin per dr vidal prior tx plan





microcytic anemia:


-iron def vs thalassemia per heme


-labs pending

## 2019-06-30 NOTE — PN
Progress Note, Physician


History of Present Illness: 





54 f hx htn, hld, dchf, copd, cad s/p pci 2016, pafib, dm, mitral stenosis here 

with sob.





- Current Medication List


Current Medications: 


Active Medications





Aspirin (Asa -)  81 mg PO DAILY [facility]


   Last Admin: 06/30/19 09:42 Dose:  81 mg


Atorvastatin Calcium (Lipitor -)  40 mg PO HS [facility]


   Last Admin: 06/29/19 21:40 Dose:  40 mg


Furosemide (Lasix Injection -)  40 mg IVPUSH BIDISORDIL [facility]


   Last Admin: 06/30/19 09:42 Dose:  40 mg


Gabapentin (Neurontin -)  300 mg PO TID [facility]


   Last Admin: 06/30/19 05:29 Dose:  300 mg


Metoprolol Succinate (Toprol Xl -)  25 mg PO DAILY [facility]


   Last Admin: 06/30/19 09:42 Dose:  25 mg


Pantoprazole Sodium (Protonix -)  20 mg PO DAILY [facility]


   Last Admin: 06/30/19 09:42 Dose:  20 mg


Paroxetine HCl (Paxil -)  10 mg PO DAILY [facility]


   Last Admin: 06/30/19 09:42 Dose:  10 mg


Sotalol HCl (Betapace -)  80 mg PO BID [facility]


   Last Admin: 06/30/19 09:42 Dose:  80 mg


Varenicline (Chantix -)  1 mg PO BID [facility]


   Last Admin: 06/30/19 09:43 Dose:  1 mg


Warfarin Sodium (Coumadin -)  10 mg PO DAILY@1800 [facility]











- Objective


Vital Signs: 


 Vital Signs











Temperature  98.4 F   06/30/19 09:47


 


Pulse Rate  75   06/30/19 09:47


 


Respiratory Rate  18   06/30/19 09:47


 


Blood Pressure  123/65   06/30/19 09:47


 


O2 Sat by Pulse Oximetry (%)  95   06/30/19 08:04











Cardiovascular: Yes: Murmur, S1, S2


Respiratory: Yes: Regular, CTA Bilaterally


Edema: LLE: Trace, RLE: Trace


Labs: 


 CBC, BMP





 06/29/19 06:14 





 06/30/19 06:05 





 INR, PTT











INR  2.34  (0.83-1.09)  H  06/30/19  07:38    














Problem List





- Problems


(1) CHF (congestive heart failure)


Assessment/Plan: 


IV LASIX


MONITOR LABS


Code(s): I50.9 - HEART FAILURE, UNSPECIFIED   


Qualifiers: 


   Heart failure type: unspecified   Heart failure chronicity: unspecified   

Qualified Code(s): I50.9 - Heart failure, unspecified   





(2) Mitral valve stenosis


Assessment/Plan: 


CARDIO ON BOARD


WILL NEED CATH AND POSSIBLE MV SURGERY --TRANSFER TO [facility]


Code(s): I05.0 - RHEUMATIC MITRAL STENOSIS   





(3) SOB (shortness of breath)


Assessment/Plan: 


AS ABOVE


Code(s): R06.02 - SHORTNESS OF BREATH   





(4) CAD (coronary artery disease)


Assessment/Plan: 


Orders





06/29/19 10:00


Aspirin [ASA -]   81 mg PO DAILY 


Metoprolol Succinate [Toprol Xl -]   25 mg PO DAILY 





06/29/19 22:00


Atorvastatin Ca [Lipitor -]   40 mg PO HS 











Code(s): I25.10 - ATHSCL HEART DISEASE OF NATIVE CORONARY ARTERY W/O ANG PCTRS 

  





(5) COPD (chronic obstructive pulmonary disease)


Code(s): J44.9 - CHRONIC OBSTRUCTIVE PULMONARY DISEASE, UNSPECIFIED   





(6) Anemia


Assessment/Plan: 


W/U ORDERED


HEM CONSULT


GI CONSULT


SPEP/UPEP/IMMUNOFIXATION


STOOL NEG OB


Code(s): D64.9 - ANEMIA, UNSPECIFIED

## 2019-07-01 NOTE — EKG
Test Reason : 

Blood Pressure : ***/*** mmHG

Vent. Rate : 072 BPM     Atrial Rate : 072 BPM

   P-R Int : 174 ms          QRS Dur : 082 ms

    QT Int : 476 ms       P-R-T Axes : 054 088 023 degrees

   QTc Int : 521 ms

 

NORMAL SINUS RHYTHM

POSSIBLE LEFT ATRIAL ENLARGEMENT

PROLONGED QT

ABNORMAL ECG

WHEN COMPARED WITH ECG OF 29-DEC-2008 17:57,

T WAVE INVERSION NOW EVIDENT IN ANTERIOR LEADS

Confirmed by MD Sedrick, Ze ([id number]) on 7/1/2019 12:36:26 AM

 

Referred By:             Confirmed By:Ze Dubose MD

## 2019-07-01 NOTE — EKG
Test Reason : 

Blood Pressure : ***/*** mmHG

Vent. Rate : 074 BPM     Atrial Rate : 074 BPM

   P-R Int : 182 ms          QRS Dur : 074 ms

    QT Int : 450 ms       P-R-T Axes : 058 088 051 degrees

   QTc Int : 499 ms

 

NORMAL SINUS RHYTHM

PROLONGED QT

ABNORMAL ECG

WHEN COMPARED WITH ECG OF 28-JUN-2019 12:28,

NONSPECIFIC T WAVE ABNORMALITY NO LONGER EVIDENT IN INFERIOR LEADS

NONSPECIFIC T WAVE ABNORMALITY HAS REPLACED INVERTED T WAVES IN 

ANTERIOR LEADS

Confirmed by MD Serdick, Ze ([id number]) on 7/1/2019 12:15:38 AM

 

Referred By: RICHARD ALMAGUER           Confirmed By:Ze Dubose MD

## 2020-02-04 ENCOUNTER — HOSPITAL ENCOUNTER (INPATIENT)
Dept: HOSPITAL 74 - JER | Age: 55
LOS: 3 days | Discharge: HOME HEALTH | DRG: 420 | End: 2020-02-07
Attending: FAMILY MEDICINE | Admitting: INTERNAL MEDICINE
Payer: COMMERCIAL

## 2020-02-04 VITALS — BODY MASS INDEX: 32.6 KG/M2

## 2020-02-04 DIAGNOSIS — I25.2: ICD-10-CM

## 2020-02-04 DIAGNOSIS — I50.9: ICD-10-CM

## 2020-02-04 DIAGNOSIS — I05.0: ICD-10-CM

## 2020-02-04 DIAGNOSIS — Z79.4: ICD-10-CM

## 2020-02-04 DIAGNOSIS — I13.0: ICD-10-CM

## 2020-02-04 DIAGNOSIS — E11.649: ICD-10-CM

## 2020-02-04 DIAGNOSIS — N18.9: ICD-10-CM

## 2020-02-04 DIAGNOSIS — E11.65: Primary | ICD-10-CM

## 2020-02-04 DIAGNOSIS — R35.8: ICD-10-CM

## 2020-02-04 DIAGNOSIS — J44.9: ICD-10-CM

## 2020-02-04 DIAGNOSIS — E11.22: ICD-10-CM

## 2020-02-04 DIAGNOSIS — E78.5: ICD-10-CM

## 2020-02-04 DIAGNOSIS — I25.10: ICD-10-CM

## 2020-02-04 LAB
ALBUMIN SERPL-MCNC: 3.7 G/DL (ref 3.4–5)
ALBUMIN SERPL-MCNC: 4.2 G/DL (ref 3.4–5)
ALP SERPL-CCNC: 44 U/L (ref 45–117)
ALP SERPL-CCNC: 53 U/L (ref 45–117)
ALT SERPL-CCNC: 33 U/L (ref 13–61)
ALT SERPL-CCNC: 36 U/L (ref 13–61)
ANION GAP SERPL CALC-SCNC: 9 MMOL/L (ref 8–16)
ANION GAP SERPL CALC-SCNC: 9 MMOL/L (ref 8–16)
APPEARANCE UR: CLEAR
AST SERPL-CCNC: 28 U/L (ref 15–37)
AST SERPL-CCNC: 41 U/L (ref 15–37)
BASOPHILS # BLD: 0.4 % (ref 0–2)
BILIRUB SERPL-MCNC: 0.7 MG/DL (ref 0.2–1)
BILIRUB SERPL-MCNC: 1 MG/DL (ref 0.2–1)
BILIRUB UR STRIP.AUTO-MCNC: NEGATIVE MG/DL
BNP SERPL-MCNC: 255.3 PG/ML (ref 5–125)
BUN SERPL-MCNC: 19.5 MG/DL (ref 7–18)
BUN SERPL-MCNC: 22.6 MG/DL (ref 7–18)
CALCIUM SERPL-MCNC: 9.1 MG/DL (ref 8.5–10.1)
CALCIUM SERPL-MCNC: 9.6 MG/DL (ref 8.5–10.1)
CHLORIDE SERPL-SCNC: 91 MMOL/L (ref 98–107)
CHLORIDE SERPL-SCNC: 95 MMOL/L (ref 98–107)
CO2 SERPL-SCNC: 31 MMOL/L (ref 21–32)
CO2 SERPL-SCNC: 32 MMOL/L (ref 21–32)
COLOR UR: YELLOW
CREAT SERPL-MCNC: 1.2 MG/DL (ref 0.55–1.3)
CREAT SERPL-MCNC: 1.4 MG/DL (ref 0.55–1.3)
DEPRECATED RDW RBC AUTO: 16.3 % (ref 11.6–15.6)
EOSINOPHIL # BLD: 0.3 % (ref 0–4.5)
GLUCOSE SERPL-MCNC: 277 MG/DL (ref 74–106)
GLUCOSE SERPL-MCNC: 392 MG/DL (ref 74–106)
HCT VFR BLD CALC: 47.1 % (ref 32.4–45.2)
HGB BLD-MCNC: 15.3 GM/DL (ref 10.7–15.3)
KETONES UR QL STRIP: (no result)
LEUKOCYTE ESTERASE UR QL STRIP.AUTO: NEGATIVE
LIPASE SERPL-CCNC: 124 U/L (ref 73–393)
LYMPHOCYTES # BLD: 31.5 % (ref 8–40)
MAGNESIUM SERPL-MCNC: 1.9 MG/DL (ref 1.8–2.4)
MCH RBC QN AUTO: 26.5 PG (ref 25.7–33.7)
MCHC RBC AUTO-ENTMCNC: 32.4 G/DL (ref 32–36)
MCV RBC: 81.7 FL (ref 80–96)
MONOCYTES # BLD AUTO: 9.3 % (ref 3.8–10.2)
NEUTROPHILS # BLD: 58.5 % (ref 42.8–82.8)
NITRITE UR QL STRIP: NEGATIVE
PH BLDV: 7.38 [PH] (ref 7.31–7.41)
PH UR: 5 [PH] (ref 5–8)
PHOSPHATE SERPL-MCNC: 3.6 MG/DL (ref 2.5–4.9)
PLATELET # BLD AUTO: 186 K/MM3 (ref 134–434)
PMV BLD: 11.6 FL (ref 7.5–11.1)
POTASSIUM SERPLBLD-SCNC: 3.5 MMOL/L (ref 3.5–5.1)
POTASSIUM SERPLBLD-SCNC: 3.9 MMOL/L (ref 3.5–5.1)
PROT SERPL-MCNC: 6.9 G/DL (ref 6.4–8.2)
PROT SERPL-MCNC: 7.7 G/DL (ref 6.4–8.2)
PROT UR QL STRIP: (no result)
PROT UR QL STRIP: NEGATIVE
RBC # BLD AUTO: 5.76 M/MM3 (ref 3.6–5.2)
SODIUM SERPL-SCNC: 131 MMOL/L (ref 136–145)
SODIUM SERPL-SCNC: 134 MMOL/L (ref 136–145)
SP GR UR: 1.03 (ref 1.01–1.03)
UROBILINOGEN UR STRIP-MCNC: 1 MG/DL (ref 0.2–1)
VENOUS PC02: 59 MMHG (ref 38–52)
VENOUS PO2: < 49 MMHG (ref 28–48)
WBC # BLD AUTO: 7.1 K/MM3 (ref 4–10)

## 2020-02-04 NOTE — PDOC
Rapid Medical Evaluation


Time Seen by Provider: 02/04/20 14:13


Medical Evaluation: 


 Allergies











Allergy/AdvReac Type Severity Reaction Status Date / Time


 


doxycycline Allergy   Verified 06/28/19 18:11


 


erythromycin base Allergy   Verified 06/28/19 18:11


 


Penicillins Allergy   Verified 06/28/19 18:11


 


tetracycline Allergy   Verified 06/28/19 18:11











02/04/20 14:14


Pt c/o: "high glucose " yesterday was 584, not on reg insulin PRN, polyuria, 

polydypsia, vss


Pt on brief exam: vss, lcta


Pt ordered for: labs, urine, iv


pt to proceed to the ED





**Discharge Disposition





- Diagnosis


 High glucose








- Discharge Dispostion


Disposition: [facility]NS/HOME HEALTH CARE


Condition at time of disposition: Stable





- Referrals





- Patient Instructions





- Post Discharge Activity

## 2020-02-04 NOTE — HP
Admitting History and Physical





- Primary Care Physician


PCP: Susy Luevano A





- Admission


Chief Complaint: Elevated Blood Sugars, Dizziness, Headache, Polyuria


History of Present Illness: 





This is a 54 y/o woman with a PMHx of HTN, HLD, CAD, [facility], T2[facility]

, COPD. Who presents to the ED with elevated blood sugars x 4 weeks, headache, 

dizziness, nausea, polyuria. Patient reports change in her diabetes regimen. 

Patient reports having "very high" blood sugars at home. Patient reports eating 

clean avoiding processed foods, cooking at home. Patient reports having 

polyuria and polydipsia. Patient reports having falls due to dizziness. Patient 

denies head injury or LOC. Patient denies fever, chills, cough, SOB, CP, 

palpitations, AP, vomiting, constipation.





History Source: Patient


Limitations to Obtaining History: No Limitations





- Past Medical History


Cardiovascular: Yes: AFIB, CAD, CHF, HTN, Hyperlipdemia, Mitral Stenosis, Murmur


Pulmonary: Yes: COPD


Heme/Onc: Yes: Anemia (PCI STENT)





- Past Surgical History


Past Surgical History: Yes: Stent


Additional Past Surgical History: 





Mitral Valve replacement





- Smoking History


Smoking history: Never smoked


Have you smoked in the past 12 months: No





- Alcohol/Substance Use


Hx Alcohol Use: No


History of Substance Use: reports: None





- Social History


Usual Living Arrangement: Yes: Alone


ADL: Independent


History of Recent Travel: Yes





Home Medications





- Allergies


Allergies/Adverse Reactions: 


 Allergies











Allergy/AdvReac Type Severity Reaction Status Date / Time


 


doxycycline Allergy   Verified 02/04/20 14:15


 


erythromycin base Allergy   Verified 02/04/20 14:15


 


Penicillins Allergy   Verified 02/04/20 14:15


 


tetracycline Allergy   Verified 02/04/20 14:15














- Home Medications


Home Medications: 


Ambulatory Orders





Aspirin [ASA -] 81 mg PO DAILY 06/28/19 


Atorvastatin Ca [Lipitor] 80 mg PO HS 06/28/19 


Gabapentin 400 mg PO TID 06/28/19 


Paroxetine HCl [Paxil -] 10 mg PO DAILY 06/28/19 


Warfarin Sodium [Coumadin] 10 mg PO DAILY 06/28/19 


Atenolol/Chlorthalidone [Atenolol-Chlorthalidone 100-25] 1 each PO DAILY 02/04/ 20 


Dulaglutide [Trulicity] 0.75 mg SQ WEEKLY 02/04/20 


Tiotropium Br/Olodaterol HCl [Stiolto Respimat Inhal Spray] 1 puff PO PRN PRN 02 /04/20 


Warfarin Sodium 10 mg PO HS 02/04/20 


metFORMIN HCL [Metformin HCl] 1,000 mg PO BID 02/04/20 











Family Medical History


Family History: Unable to Obtain





Review of Systems





- Review of Systems


Constitutional: reports: No Symptoms


Eyes: reports: No Symptoms


HENT: reports: No Symptoms


Neck: reports: No Symptoms


Cardiovascular: reports: No Symptoms


Respiratory: reports: No Symptoms


Gastrointestinal: reports: Nausea


Genitourinary: reports: Frequency


Breasts: reports: No Symptoms Reported


Musculoskeletal: reports: No Symptoms


Integumentary: reports: No Symptoms


Neurological: reports: Dizziness, Headache


Endocrine: reports: No Symptoms


Hematology/Lymphatic: reports: No Symptoms


Psychiatric: reports: No Symptoms





Physical Examination


Vital Signs: 


 Vital Signs











Temperature  98.2 F   02/04/20 15:50


 


Pulse Rate  84   02/04/20 15:50


 


Respiratory Rate  18   02/04/20 15:50


 


Blood Pressure  106/74   02/04/20 15:50


 


O2 Sat by Pulse Oximetry (%)  100   02/04/20 15:50











Constitutional: Yes: Well Nourished, No Distress, Calm, Obese


Eyes: Yes: WNL, Conjunctiva Clear, EOM Intact, PERRL


HENT: Yes: WNL, Atraumatic, Normocephalic


Neck: Yes: WNL, Supple, Trachea Midline


Cardiovascular: Yes: Regular Rate and Rhythm, S1, S2


Respiratory: Yes: WNL, Regular, CTA Bilaterally


Gastrointestinal: Yes: WNL, Normal Bowel Sounds, Soft, Abdomen, Obese


...Rectal Exam: Yes: WNL, Deferred


Renal/: Yes: WNL


Breast(s): Yes: WNL


Musculoskeletal: Yes: WNL


Extremities: Yes: WNL


Edema: No


Peripheral Pulses WNL: Yes


Integumentary: Yes: WNL


Neurological: Yes: WNL, Alert, Oriented, Cran Nerves II-XII Intact


...Motor Strength: WNL


Psychiatric: Yes: WNL, Alert, Oriented


Labs: 


 CBC, BMP





 02/04/20 15:41 











Imaging





- Results


Chest X-ray: Report Reviewed, Image Reviewed


EKG: Image Reviewed





Problem List





- Problems


(1) Abnormal EKG


Assessment/Plan: 


Likely secondary to ACS


hx MI with stents, open heart


EKG- NSR with ST depressions


Continue cardiac monitoring


Serial Enzymes neg x1


Appreciate Cardiology consult








Code(s): R94.31 - ABNORMAL ELECTROCARDIOGRAM [ECG] [EKG]   





(2) Uncontrolled diabetes mellitus


Assessment/Plan: 


Less likely DKA vs HHS


AG 9


BHydroxybutyrate 10.6


Lactic Acid 2.2 likely 2/2 Metformin use


UA +3 glucose, trace ketones


NS bolus given in ED


Continue IVF


Appreciate Endocrinology consult


ISS


HgbA1c in am


Hold Metformin secondary to Lactate 2.2


Monitor renal function








Code(s): E11.65 - TYPE 2 DIABETES MELLITUS WITH HYPERGLYCEMIA   





(3) SUNNI (acute kidney injury)


Assessment/Plan: 


Likely due to dehydration


Continue IVF


Monitor BMP


Consider Renal consult if condition worsens


Monitor vitals


Code(s): N17.9 - ACUTE KIDNEY FAILURE, UNSPECIFIED   





(4) HTN (hypertension)


Assessment/Plan: 


stable


Continue atenolol


Hold HCTZ secondary to SUNNI


Monitor BMP


Monitor renal function


Code(s): I10 - ESSENTIAL (PRIMARY) HYPERTENSION   





(5) HLD (hyperlipidemia)


Assessment/Plan: 


stable


Continue Lipitor


Monitor LFTs





Code(s): E78.5 - HYPERLIPIDEMIA, UNSPECIFIED   





(6) Anemia


Assessment/Plan: 


stable


Hgb 15.3


Will transfuse if Hgb < 7.0


Monitor CBC


Code(s): D64.9 - ANEMIA, UNSPECIFIED   





(7) CAD (coronary artery disease)


Assessment/Plan: 


Continue home meds


EKG- reviewed


Chest Xray- no acute pathology


Code(s): I25.10 - ATHSCL HEART DISEASE OF NATIVE CORONARY ARTERY W/O ANG PCTRS 

  





(8) Mitral valve stenosis


Assessment/Plan: 


s/p MVR


EKG reviewed


INR-pending


Continue Coumadin,maintain INR (2.5-3.5)


Series INRs


Code(s): I05.0 - RHEUMATIC MITRAL STENOSIS   





(9) CHF (congestive heart failure)


Assessment/Plan: 


stable


No acute flare


Chest xray- no acute pathology


Hold HCTZ secondary to SUNNI


Code(s): I50.9 - HEART FAILURE, UNSPECIFIED   


Qualifiers: 


   Heart failure type: unspecified   Heart failure chronicity: unspecified   

Qualified Code(s): I50.9 - Heart failure, unspecified   





(10) COPD (chronic obstructive pulmonary disease)


Assessment/Plan: 


stable


No acute flare


Albuterol neb prn


Continue home med





Code(s): J44.9 - CHRONIC OBSTRUCTIVE PULMONARY DISEASE, UNSPECIFIED   





Assessment/Plan





This is a 54 y/o woman with a PMHx of HTN, HLD, CAD, MI s/p mech MVR, s/p PCI, 

pAfib, T2DM, COPD. Admitted to Telemetry for Abnormal EKG, Dehydration, SUNNI for 

further evaluation of their emergent condition.





Plan:


See Problem List





FEN


PO fluids as tolerated


Replete lytes prn


Low Na, Diabetic Diet





DVT ppx


OOB


SCDs


Continue Coumadin (awaiting INR)





Dispo: Requires Inpatient Care

















Visit type





- Emergency Visit


Emergency Visit: Yes


ED Registration Date: 02/04/20


Care time: The patient presented to the Emergency Department on the above date 

and was hospitalized for further evaluation of their emergent condition.





- New Patient


This patient is new to me today: Yes


Date on this admission: 02/04/20





- Critical Care


Critical Care patient: No

## 2020-02-04 NOTE — PDOC
History of Present Illness





- General


Chief Complaint: Blood Sugar Problem


Stated Complaint: HIGH BLOOD SUGAR


Time Seen by Provider: 02/04/20 14:13





- History of Present Illness


Initial Comments: 





Ms. Vera is a pleasant 56 y/o female with PMH significant for DMT2, MI, s/p 

open heart surgery, COPD, HTN, HLD, carotid stents, presenting today with 4 

weeks of high blood sugar, headache, dizziness, nausea w/o vomiting, frequent 

urination. She has seen her PCP multiple times and was increased to metformin 

1000 mg BID and given a trulicity injection. Reports that her blood sugar 

continues to be > 600 and she is not able to bring it down. Also reports 

weakness in bilaterallegs and falls x2 yesterday. 





No chest pain/shortness of breath. No abdominal pain. No dysuria. Reports 

diarrhea x2 yesterday. Reports that she has been admitted to the ICU for DMT2 

before. Diagnosed in 2012. 





Past History





- Past Medical History


Allergies/Adverse Reactions: 


 Allergies











Allergy/AdvReac Type Severity Reaction Status Date / Time


 


doxycycline Allergy   Verified 02/04/20 14:15


 


erythromycin base Allergy   Verified 02/04/20 14:15


 


Penicillins Allergy   Verified 02/04/20 14:15


 


tetracycline Allergy   Verified 02/04/20 14:15











Home Medications: 


Ambulatory Orders





Aspirin [ASA -] 81 mg PO DAILY 06/28/19 


Atorvastatin Ca [Lipitor] 80 mg PO HS 06/28/19 


Gabapentin 400 mg PO TID 06/28/19 


Paroxetine HCl [Paxil -] 10 mg PO DAILY 06/28/19 


Warfarin Sodium [Coumadin] 10 mg PO DAILY 06/28/19 


Atenolol/Chlorthalidone [Atenolol-Chlorthalidone 100-25] 1 each PO DAILY 02/04/ 20 


Dulaglutide [Trulicity] 0.75 mg SQ WEEKLY 02/04/20 


Tiotropium Br/Olodaterol HCl [Stiolto Respimat Inhal Spray] 1 puff PO PRN PRN 02 /04/20 


Warfarin Sodium 10 mg PO HS 02/04/20 


metFORMIN HCL [Metformin HCl] 1,000 mg PO BID 02/04/20 








Anemia: No


Cancer: No


CVA: No


COPD: Yes


CHF: Yes


Dementia: No


Diabetes: Yes


HTN: Yes


Hypercholesterolemia: Yes


Seizures: No





- Surgical History


Cardiac Surgery: Yes (STENT Placement)





- Psycho Social/Smoking Cessation Hx


Smoking History: Never smoked


Have you smoked in the past 12 months: No


Hx Alcohol Use: No


Drug/Substance Use Hx: No





**Review of Systems





- Review of Systems


Comments:: 





GENERAL/CONSTITUTIONAL: No fever or chills. Reports generalized weakness. 


HEAD, EYES, EARS, NOSE AND THROAT: No change in vision. No change in hearing. 

No sore throat._


CARDIOVASCULAR: No chest pain or shortness of breath_


RESPIRATORY: Denies cough, hemoptysis_


GASTROINTESTINAL: Reports nausea and diarrhea. No vomiting. 


GENITOURINARY: Reports frequent urination. 


MUSCULOSKELETAL: No joint or muscle swelling or pain. No neck or back pain._


SKIN: No rash_


NEUROLOGIC: Reports headache. Reports lightheadedness. No vertigo, loss of 

consciousness, or change in strength/sensation._


ENDOCRINE: Reports increased thirst. No abnormal weight change_


HEMATOLOGIC/LYMPHATIC: No anemia, easy bleeding, or history of blood clots._


ALLERGIC/IMMUNOLOGIC: No hives or skin allergy._











*Physical Exam





- Vital Signs


 Last Vital Signs











Temp Pulse Resp BP Pulse Ox


 


 98 F   86   18   127/76   98 


 


 02/04/20 14:12  02/04/20 14:12  02/04/20 14:12  02/04/20 14:12  02/04/20 14:12














- Physical Exam





GENERAL: Awake, alert, and oriented to person/place/time, in no acute distress_


HEAD: No signs of trauma, normoc ephalic, atraumatic _


EYES: PERRLA, EOMI, sclera anicteric, conjunctiva clear_


ENT: Hearing grossly normal, nares patent, oropharynx clear without exudates. 

No uvular deviation. Dry mucous membranes. 


NECK: Normal ROM, supple, no lymphadenopathy, JVD, or masses_


LUNGS: No distress, speaks in full sentences, clear to auscultation bilaterally 

_


HEART: Regular rate and rhythm, normal S1 and S2, no murmurs appreciated, 

peripheral pulses normal and equal bilaterally._


ABDOMEN: Soft, nontender, normoactive bowel sounds. No guarding, no rebound. No 

masses_


EXTREMITIES: Normal inspection, Normal range of motion, no edema. No clubbing 

or cyanosis_


NEUROLOGICAL: Cranial nerves II through XII grossly intact. Normal speech, 

normal gait, no focal sensorimotor deficits _


SKIN: Warm, Dry, normal turgor, no rashes or lesions noted_








ED Treatment Course





- LABORATORY


CBC & Chemistry Diagram: 


 02/04/20 15:41





 02/04/20 19:40





- Medications


Given in the ED: 


ED Medications














Discontinued Medications














Generic Name Dose Route Start Last Admin





  Trade Name Ange  PRN Reason Stop Dose Admin


 


Sodium Chloride  500 mls @ 250 mls/hr  02/04/20 14:16  02/04/20 15:46





  Normal Saline -  IV  02/04/20 16:15  250 mls/hr





  ASDIR STA   Administration





     





     





     





     














Medical Decision Making





- Medical Decision Making





02/04/20 16:21


55F presenting today with high blood sugar for 4 weeks. On metformin 1000 mg 

BID.


-cbc, cmp, lactic


-ekg, cxr


-beta hydroxybutyrate 


-mag phos


-blood cx, ua, ucx


-vbg


-fluids 





02/04/20 16:36


EKG shows NSR, 81 bpm, no ST elevation, QTc 478. 





02/04/20 17:00


Labs reviewed.


 Laboratory Last Values











WBC  7.1 K/mm3 (4.0-10.0)   02/04/20  15:41    


 


RBC  5.76 M/mm3 (3.60-5.2)  H  02/04/20  15:41    


 


Hgb  15.3 GM/dL (10.7-15.3)   02/04/20  15:41    


 


Hct  47.1 % (32.4-45.2)  H D 02/04/20  15:41    


 


MCV  81.7 fl (80-96)   02/04/20  15:41    


 


MCH  26.5 pg (25.7-33.7)  D 02/04/20  15:41    


 


MCHC  32.4 g/dl (32.0-36.0)   02/04/20  15:41    


 


RDW  16.3 % (11.6-15.6)  H  02/04/20  15:41    


 


Plt Count  186 K/MM3 (134-434)  D 02/04/20  15:41    


 


MPV  11.6 fl (7.5-11.1)  H D 02/04/20  15:41    


 


Absolute Neuts (auto)  4.2 K/mm3 (1.5-8.0)   02/04/20  15:41    


 


Neutrophils %  58.5 % (42.8-82.8)  D 02/04/20  15:41    


 


Lymphocytes %  31.5 % (8-40)  D 02/04/20  15:41    


 


Monocytes %  9.3 % (3.8-10.2)   02/04/20  15:41    


 


Eosinophils %  0.3 % (0-4.5)   02/04/20  15:41    


 


Basophils %  0.4 % (0-2.0)   02/04/20  15:41    


 


Nucleated RBC %  0 % (0-0)   02/04/20  15:41    


 


PTT (Actin FS)  38.4 SECONDS (25.2-36.5)  H  02/04/20  19:40    


 


VBG pH  7.38  (7.31-7.41)   02/04/20  15:41    


 


POC VBG pCO2  59.0 mmHg (38-52)  H  02/04/20  15:41    


 


POC VBG pO2  < 49 mmHg (28-48)  H  02/04/20  15:41    


 


VBG HCO3  34.1 mmol/L (23-29)  H  02/04/20  15:41    


 


VBG O2 Sat (Nithin)  33.7 % (70-80)  L  02/04/20  15:41    


 


VBG Base Excess  7.1 meq/l (-2-2)  H  02/04/20  15:41    


 


Sodium  134 mmol/L (136-145)  L  02/04/20  19:40    


 


Potassium  3.9 mmol/L (3.5-5.1)   02/04/20  19:40    


 


Chloride  95 mmol/L ()  L  02/04/20  19:40    


 


Carbon Dioxide  31 mmol/L (21-32)   02/04/20  19:40    


 


Anion Gap  9 MMOL/L (8-16)   02/04/20  19:40    


 


BUN  19.5 mg/dL (7-18)  H  02/04/20  19:40    


 


Creatinine  1.2 mg/dL (0.55-1.3)   02/04/20  19:40    


 


Est GFR (CKD-EPI)AfAm  59.75   02/04/20  19:40    


 


Est GFR (CKD-EPI)NonAf  51.55   02/04/20  19:40    


 


POC Glucometer  276 UNITS ()   02/04/20  21:31    


 


Random Glucose  277 mg/dL ()  H  02/04/20  19:40    


 


Lactic Acid  2.2 mmol/L (0.4-2.0)  H*  02/04/20  15:46    


 


Calcium  9.1 mg/dL (8.5-10.1)   02/04/20  19:40    


 


Phosphorus  3.6 mg/dL (2.5-4.9)   02/04/20  15:46    


 


Magnesium  1.9 mg/dL (1.8-2.4)   02/04/20  15:46    


 


Total Bilirubin  1.0 mg/dL (0.2-1)   02/04/20  19:40    


 


AST  41 U/L (15-37)  H  02/04/20  19:40    


 


ALT  33 U/L (13-61)   02/04/20  19:40    


 


Alkaline Phosphatase  44 U/L ()  L  02/04/20  19:40    


 


B-Natriuretic Peptide  255.3 pg/ml (5-125)  H  02/04/20  15:46    


 


Total Protein  6.9 g/dl (6.4-8.2)   02/04/20  19:40    


 


Albumin  3.7 g/dl (3.4-5.0)   02/04/20  19:40    


 


Lipase  124 U/L ()   02/04/20  19:40    


 


Beta-Hydroxybutyrate  10.6 mg/dL (0.2-2.8)  H  02/04/20  15:46    


 


Urine Color  Yellow   02/04/20  16:48    


 


Urine Appearance  Clear   02/04/20  16:48    


 


Urine pH  5.0  (5.0-8.0)   02/04/20  16:48    


 


Ur Specific Gravity  1.032  (1.010-1.035)   02/04/20  16:48    


 


Urine Protein  Negative  (NEGATIVE)   02/04/20  16:48    


 


Urine Glucose (UA)  3+  (NEGATIVE)  H  02/04/20  16:48    


 


Urine Ketones  Trace  (NEGATIVE)  H  02/04/20  16:48    


 


Urine Blood  Negative  (NEGATIVE)   02/04/20  16:48    


 


Urine Nitrite  Negative  (NEGATIVE)   02/04/20  16:48    


 


Urine Bilirubin  Negative  (NEGATIVE)   02/04/20  16:48    


 


Urine Urobilinogen  1.0 mg/dL (0.2-1.0)   02/04/20  16:48    


 


Ur Leukocyte Esterase  Negative  (NEGATIVE)   02/04/20  16:48    














02/04/20 19:30


Lactic elevated. Glucose elevated. 


D/w the hospitalist team who accepts the patient for admission. 








Discharge





- Discharge Information


Problems reviewed: Yes


Clinical Impression/Diagnosis: 


 High glucose





Condition: Stable





- Admission


Yes





- Follow up/Referral





- Patient Discharge Instructions





- Post Discharge Activity

## 2020-02-05 LAB
ANION GAP SERPL CALC-SCNC: 8 MMOL/L (ref 8–16)
BASOPHILS # BLD: 0.5 % (ref 0–2)
BUN SERPL-MCNC: 16.3 MG/DL (ref 7–18)
CALCIUM SERPL-MCNC: 8.8 MG/DL (ref 8.5–10.1)
CHLORIDE SERPL-SCNC: 96 MMOL/L (ref 98–107)
CO2 SERPL-SCNC: 31 MMOL/L (ref 21–32)
CREAT SERPL-MCNC: 1.1 MG/DL (ref 0.55–1.3)
DEPRECATED RDW RBC AUTO: 16.2 % (ref 11.6–15.6)
EOSINOPHIL # BLD: 0.7 % (ref 0–4.5)
GLUCOSE SERPL-MCNC: 356 MG/DL (ref 74–106)
HCT VFR BLD CALC: 40.9 % (ref 32.4–45.2)
HGB BLD-MCNC: 13.4 GM/DL (ref 10.7–15.3)
INR BLD: 2.04 (ref 0.83–1.09)
LYMPHOCYTES # BLD: 35.4 % (ref 8–40)
MCH RBC QN AUTO: 26.8 PG (ref 25.7–33.7)
MCHC RBC AUTO-ENTMCNC: 32.8 G/DL (ref 32–36)
MCV RBC: 81.5 FL (ref 80–96)
MONOCYTES # BLD AUTO: 10.9 % (ref 3.8–10.2)
NEUTROPHILS # BLD: 52.5 % (ref 42.8–82.8)
PLATELET # BLD AUTO: 156 K/MM3 (ref 134–434)
PMV BLD: 11 FL (ref 7.5–11.1)
POTASSIUM SERPLBLD-SCNC: 3.2 MMOL/L (ref 3.5–5.1)
PT PNL PPP: 24.3 SEC (ref 9.7–13)
RBC # BLD AUTO: 5.02 M/MM3 (ref 3.6–5.2)
SODIUM SERPL-SCNC: 135 MMOL/L (ref 136–145)
WBC # BLD AUTO: 6.4 K/MM3 (ref 4–10)

## 2020-02-05 RX ADMIN — GABAPENTIN SCH MG: 400 CAPSULE ORAL at 13:42

## 2020-02-05 RX ADMIN — GABAPENTIN SCH MG: 400 CAPSULE ORAL at 06:44

## 2020-02-05 RX ADMIN — POTASSIUM CHLORIDE SCH: 7.46 INJECTION, SOLUTION INTRAVENOUS at 10:16

## 2020-02-05 RX ADMIN — INSULIN DETEMIR SCH UNITS: 100 INJECTION, SOLUTION SUBCUTANEOUS at 22:11

## 2020-02-05 RX ADMIN — ATENOLOL SCH MG: 50 TABLET ORAL at 10:17

## 2020-02-05 RX ADMIN — ASPIRIN 81 MG SCH MG: 81 TABLET ORAL at 10:16

## 2020-02-05 RX ADMIN — POTASSIUM CHLORIDE SCH: 7.46 INJECTION, SOLUTION INTRAVENOUS at 10:52

## 2020-02-05 RX ADMIN — INSULIN ASPART SCH UNITS: 100 INJECTION, SOLUTION INTRAVENOUS; SUBCUTANEOUS at 11:57

## 2020-02-05 RX ADMIN — INSULIN ASPART SCH: 100 INJECTION, SOLUTION INTRAVENOUS; SUBCUTANEOUS at 17:11

## 2020-02-05 RX ADMIN — INSULIN ASPART SCH UNITS: 100 INJECTION, SOLUTION INTRAVENOUS; SUBCUTANEOUS at 22:05

## 2020-02-05 RX ADMIN — ATORVASTATIN CALCIUM SCH MG: 40 TABLET, FILM COATED ORAL at 22:04

## 2020-02-05 RX ADMIN — TIOTROPIUM BROMIDE AND OLODATEROL SCH: 3.124; 2.736 SPRAY, METERED RESPIRATORY (INHALATION) at 10:17

## 2020-02-05 RX ADMIN — INSULIN ASPART SCH UNITS: 100 INJECTION, SOLUTION INTRAVENOUS; SUBCUTANEOUS at 07:36

## 2020-02-05 RX ADMIN — SODIUM CHLORIDE SCH MLS/HR: 9 INJECTION, SOLUTION INTRAVENOUS at 04:31

## 2020-02-05 RX ADMIN — GABAPENTIN SCH MG: 400 CAPSULE ORAL at 22:04

## 2020-02-05 RX ADMIN — INSULIN DETEMIR SCH UNITS: 100 INJECTION, SOLUTION SUBCUTANEOUS at 10:26

## 2020-02-05 RX ADMIN — SODIUM CHLORIDE SCH MLS/HR: 9 INJECTION, SOLUTION INTRAVENOUS at 22:30

## 2020-02-05 NOTE — PN
Progress Note, Physician


History of Present Illness: 





54 y/o woman with a PMHx of HTN, HLD, CAD, MI s/p open heart sx june 2019 at 

[facility], stents, T2DM, COPD. Who presents to the ED with elevated blood sugars x 

4 weeks, headache, dizziness, nausea, polyuria. Patient reports change in her 

diabetes regimen.





- Current Medication List


Current Medications: 


Active Medications





Albuterol Sulfate (Ventolin 0.083% Nebulizer Soln -)  1 amp NEB Q6H PRN


   PRN Reason: SHORT OF BREATH/WHEEZING


Aspirin (Asa -)  81 mg PO DAILY RICHA


Atorvastatin Calcium (Lipitor -)  80 mg PO HS RICHA


Gabapentin (Neurontin -)  400 mg PO TID [facility]


   Last Admin: 02/05/20 06:44 Dose:  400 mg


Sodium Chloride (Normal Saline -)  1,000 mls @ 100 mls/hr IV ASDIR [facility]


   Last Admin: 02/05/20 04:31 Dose:  100 mls/hr


Insulin Aspart (Novolog Vial Sliding Scale -)  1 vial SQ ACHS [facility]; Protocol


   Last Admin: 02/05/20 07:36 Dose:  8 units


Paroxetine HCl (Paxil -)  10 mg PO DAILY [facility]


Tiotropium Bromide/Olodaterol (Stiolto Respimat Inhal Spray)  1 puff IH DAILY 

[facility]











- Objective


Vital Signs: 


 Vital Signs











Temperature  98.2 F   02/04/20 15:50


 


Pulse Rate  81   02/05/20 04:10


 


Respiratory Rate  18   02/05/20 04:10


 


Blood Pressure  110/68   02/05/20 04:10


 


O2 Sat by Pulse Oximetry (%)  100   02/05/20 04:10











Cardiovascular: Yes: Regular Rate and Rhythm, Murmur


Respiratory: Yes: Regular, CTA Bilaterally


Gastrointestinal: Yes: Normal Bowel Sounds, Soft.  No: Tenderness


Edema: No


Neurological: Yes: Alert, Oriented


Labs: 


 CBC, BMP





 02/05/20 06:30 





 02/05/20 06:30 





 INR, PTT











INR  2.04  (0.83-1.09)  H  02/05/20  06:30    














Problem List





- Problems


(1) Abnormal EKG


Assessment/Plan: 


s/p cabg 6/2019


Get previous from [facility]


follow labs


tele


cardiology


Code(s): R94.31 - ABNORMAL ELECTROCARDIOGRAM [ECG] [EKG]   





(2) HLD (hyperlipidemia)


Assessment/Plan: 


lipitor 80


Code(s): E78.5 - HYPERLIPIDEMIA, UNSPECIFIED   





(3) HTN (hypertension)


Assessment/Plan: 


monitor on meds


Code(s): I10 - ESSENTIAL (PRIMARY) HYPERTENSION   





(4) Uncontrolled diabetes mellitus


Assessment/Plan: 


start insulin


ivf


endo


bgm


Code(s): E11.65 - TYPE 2 DIABETES MELLITUS WITH HYPERGLYCEMIA   





(5) CAD (coronary artery disease)


Assessment/Plan: 


monitor on meds


Code(s): I25.10 - ATHSCL HEART DISEASE OF NATIVE CORONARY ARTERY W/O ANG PCTRS

## 2020-02-05 NOTE — EKG
Test Reason : 

Blood Pressure : ***/*** mmHG

Vent. Rate : 081 BPM     Atrial Rate : 081 BPM

   P-R Int : 178 ms          QRS Dur : 078 ms

    QT Int : 412 ms       P-R-T Axes : 068 088 242 degrees

   QTc Int : 478 ms

 

NORMAL SINUS RHYTHM

LEFT ATRIAL ENLARGEMENT

T WAVE ABNORMALITY, CONSIDER INFERIOR ISCHEMIA

T WAVE ABNORMALITY, CONSIDER ANTEROLATERAL ISCHEMIA

PROLONGED QT

ABNORMAL ECG

WHEN COMPARED WITH ECG OF 30-JUN-2019 09:13,

T WAVE INVERSION NOW EVIDENT IN INFERIOR LEADS

T WAVE INVERSION NOW EVIDENT IN ANTEROLATERAL LEADS

Confirmed by Sundar Iniguez MD ([id number]) on 2/5/2020 11:38:31 AM

 

Referred By:             Confirmed By:Sundar Iniguez MD

## 2020-02-05 NOTE — CON.CARD
Cardiology Consult (text)





- Consultation


Consultation Note: 





cc: hyperglycemia





hpi:  55 f hx htn, hld, dchf, copd, cad s/p pci 2016, pafib, dm, mitral 

stenosis s/p mechanical AVR 2019 p/w hyperglycemia for last 4 weeks.  Often >600

, feels tired, dizzy, headahces.   No chest pain, palps, dizziness, dyspnea.











pmh: per hpi


psh: pci, s/p mech MVR


social:  no tob


fam: no premature cad, scd


ros: per hpi; all others normal





meds:


  Current Medications





Albuterol Sulfate (Ventolin 0.083% Nebulizer Soln -)  1 amp NEB Q6H PRN


   PRN Reason: SHORT OF BREATH/WHEEZING


Aspirin (Asa -)  81 mg PO DAILY [facility]


   Last Admin: 02/05/20 10:16 Dose:  81 mg


Atenolol (Tenormin -)  100 mg PO DAILY [facility]


   Last Admin: 02/05/20 10:17 Dose:  100 mg


Atorvastatin Calcium (Lipitor -)  80 mg PO [facility]


Gabapentin (Neurontin -)  400 mg PO TID [facility]


   Last Admin: 02/05/20 06:44 Dose:  400 mg


Sodium Chloride (Normal Saline -)  1,000 mls @ 100 mls/hr IV ASDIR [facility]


   Last Admin: 02/05/20 04:31 Dose:  100 mls/hr


Insulin Aspart (Novolog Vial Sliding Scale -)  1 vial SQ ACHS [facility]; Protocol


   Last Admin: 02/05/20 07:36 Dose:  8 units


Insulin Detemir (Levemir Vial)  20 units SQ BID@0700,2200 [facility]


   Last Admin: 02/05/20 10:26 Dose:  20 units


Paroxetine HCl (Paxil -)  10 mg PO DAILY [facility]


   Last Admin: 02/05/20 10:17 Dose:  10 mg


Tiotropium Bromide/Olodaterol (Stiolto Respimat Inhal Spray)  1 puff IH DAILY 

[facility]


   Last Admin: 02/05/20 10:17 Dose:  Not Given


Warfarin Sodium (Coumadin -)  10 mg PO DAILY@1800 [facility]








pe:


 Vital Signs











 Period  Temp  Pulse  Resp  BP Sys/Cruz  Pulse Ox


 


 Last 24 Hr  97.9 F-98.2 F  80-86  18-20  106-127/68-76  











nad no jvd


rrr s1s2 no mrg


scattered rhonchi, nl eff


aaox3


no le e/c/c


abd nt nd pos bs


no jaundice diaphoresis


pos dp pt no carotid bruits


 Laboratory Last Values











WBC  6.4 K/mm3 (4.0-10.0)   02/05/20  06:30    


 


RBC  5.02 M/mm3 (3.60-5.2)   02/05/20  06:30    


 


Hgb  13.4 GM/dL (10.7-15.3)   02/05/20  06:30    


 


Hct  40.9 % (32.4-45.2)   02/05/20  06:30    


 


MCV  81.5 fl (80-96)   02/05/20  06:30    


 


MCH  26.8 pg (25.7-33.7)   02/05/20  06:30    


 


MCHC  32.8 g/dl (32.0-36.0)   02/05/20  06:30    


 


RDW  16.2 % (11.6-15.6)  H  02/05/20  06:30    


 


Plt Count  156 K/MM3 (134-434)   02/05/20  06:30    


 


MPV  11.0 fl (7.5-11.1)   02/05/20  06:30    


 


Absolute Neuts (auto)  3.3 K/mm3 (1.5-8.0)   02/05/20  06:30    


 


Neutrophils %  52.5 % (42.8-82.8)   02/05/20  06:30    


 


Lymphocytes %  35.4 % (8-40)   02/05/20  06:30    


 


Monocytes %  10.9 % (3.8-10.2)  H  02/05/20  06:30    


 


Eosinophils %  0.7 % (0-4.5)  D 02/05/20  06:30    


 


Basophils %  0.5 % (0-2.0)   02/05/20  06:30    


 


Nucleated RBC %  0 % (0-0)   02/05/20  06:30    


 


PT with INR  24.30 SEC (9.7-13.0)  H  02/05/20  06:30    


 


INR  2.04  (0.83-1.09)  H  02/05/20  06:30    


 


PTT (Actin FS)  38.4 SECONDS (25.2-36.5)  H  02/04/20  19:40    


 


VBG pH  7.38  (7.31-7.41)   02/04/20  15:41    


 


POC VBG pCO2  59.0 mmHg (38-52)  H  02/04/20  15:41    


 


POC VBG pO2  < 49 mmHg (28-48)  H  02/04/20  15:41    


 


VBG HCO3  34.1 mmol/L (23-29)  H  02/04/20  15:41    


 


VBG O2 Sat (Nithin)  33.7 % (70-80)  L  02/04/20  15:41    


 


VBG Base Excess  7.1 meq/l (-2-2)  H  02/04/20  15:41    


 


Sodium  135 mmol/L (136-145)  L  02/05/20  06:30    


 


Potassium  3.2 mmol/L (3.5-5.1)  L  02/05/20  06:30    


 


Chloride  96 mmol/L ()  L  02/05/20  06:30    


 


Carbon Dioxide  31 mmol/L (21-32)   02/05/20  06:30    


 


Anion Gap  8 MMOL/L (8-16)   02/05/20  06:30    


 


BUN  16.3 mg/dL (7-18)   02/05/20  06:30    


 


Creatinine  1.1 mg/dL (0.55-1.3)   02/05/20  06:30    


 


Est GFR (CKD-EPI)AfAm  66.38   02/05/20  06:30    


 


Est GFR (CKD-EPI)NonAf  57.27   02/05/20  06:30    


 


POC Glucometer  277 UNITS ()   02/05/20  11:26    


 


Random Glucose  356 mg/dL ()  H  02/05/20  06:30    


 


Lactic Acid  2.2 mmol/L (0.4-2.0)  H*  02/04/20  15:46    


 


Calcium  8.8 mg/dL (8.5-10.1)   02/05/20  06:30    


 


Phosphorus  3.6 mg/dL (2.5-4.9)   02/04/20  15:46    


 


Magnesium  1.9 mg/dL (1.8-2.4)   02/04/20  15:46    


 


Total Bilirubin  1.0 mg/dL (0.2-1)   02/04/20  19:40    


 


AST  41 U/L (15-37)  H  02/04/20  19:40    


 


ALT  33 U/L (13-61)   02/04/20  19:40    


 


Alkaline Phosphatase  44 U/L ()  L  02/04/20  19:40    


 


Troponin I  < 0.02 ng/ml (0.00-0.05)   02/04/20  15:46    


 


B-Natriuretic Peptide  255.3 pg/ml (5-125)  H  02/04/20  15:46    


 


Total Protein  6.9 g/dl (6.4-8.2)   02/04/20  19:40    


 


Albumin  3.7 g/dl (3.4-5.0)   02/04/20  19:40    


 


Lipase  124 U/L ()   02/04/20  19:40    


 


Beta-Hydroxybutyrate  10.6 mg/dL (0.2-2.8)  H  02/04/20  15:46    


 


Urine Color  Yellow   02/04/20  16:48    


 


Urine Appearance  Clear   02/04/20  16:48    


 


Urine pH  5.0  (5.0-8.0)   02/04/20  16:48    


 


Ur Specific Gravity  1.032  (1.010-1.035)   02/04/20  16:48    


 


Urine Protein  Negative  (NEGATIVE)   02/04/20  16:48    


 


Urine Glucose (UA)  3+  (NEGATIVE)  H  02/04/20  16:48    


 


Urine Ketones  Trace  (NEGATIVE)  H  02/04/20  16:48    


 


Urine Blood  Negative  (NEGATIVE)   02/04/20  16:48    


 


Urine Nitrite  Negative  (NEGATIVE)   02/04/20  16:48    


 


Urine Bilirubin  Negative  (NEGATIVE)   02/04/20  16:48    


 


Urine Urobilinogen  1.0 mg/dL (0.2-1.0)   02/04/20  16:48    


 


Ur Leukocyte Esterase  Negative  (NEGATIVE)   02/04/20  16:48    











ecg: sr, inferior and anterolateral ST depressions new compared to prior





cxr: no acute process





tele: sinus








a/p:  55 f hx htn, hld, dchf, copd, cad s/p pci 2016, pafib, dm, mitral 

stenosis here with hyperglycemia x4 weeks, abnormal EKG





DM, hyperglycemia


- manage per primary, endocrine





s/p [facility] MVR, hx mitral stenosis


- s/p [facility] MVR 6/2019


- cont warfarin, goal INR 2.5-3.5





abnormal EKG


- ST depressions new compared to prior


- denies anginal symptoms


- trop neg x 1, repeat trop ordered


- monitoring on tele


- check echo





htn:


-cont home meds





cad s/p pci:


-cont ac plus aspirin per Dr. Stover prior treatment plan





pafib:


- cont warfarin

## 2020-02-05 NOTE — PDOC
Documentation entered by Rudolph Chao SCRIBE, acting as scribe for John Felipe MD.








Jonh Felipe MD:  This documentation has been prepared by the Jeremie keating Daniel, SCRIBE, under my direction and personally reviewed by me in its 

entirety.  I confirm that the documentation accurately reflects all work, 

treatment, procedures, and medical decision making performed by me.  





Attending Attestation





- Resident


Resident Name: BossSundar





- ED Attending Attestation


I have performed the following: I have examined & evaluated the patient, The 

case was reviewed & discussed with the resident, I agree w/resident's findings 

& plan, Exceptions are as noted





- HPI


HPI: 





02/04/20 16:21


The patient is a 55 year old female with a past medical history of HTN, HLD, 

diabetes, CHF, CAD, afib (warfarin), MI, PCI (7/19), COPD, MVR 6/2019 here 

today for evaluation of elevated glucose. The patient reports that her blood 

sugar was in the high 500s today. She states that she has been generally weak 

for the past 2 days with multiple loose watery stools, nausea, and a sour taste 

in her mouth. She also reports that she has been admitted to the ICU before for 

diabetic complications.


 


Patient denies headache, lightheadedness. Denies fever, chills. Denies chest 

pain, shortness of breath. Denies vomiting, abdominal pain. 





Allergies: doxycycline, erythromycin base, penicillins, tetracycline


PCP: Susy Luevano








- Physicial Exam


PE: 





02/04/20 16:21


Agree with resident exam 





- Medical Decision Making








54yo F with MMP including Afib, MVR, DM presents to the ED with elevated blood 

sugars, generalized weakness, diarrhea


Found to have non AG hyperglycemia as well as diffuse new ST depressions on EKG


Pt with no chest pain but in light of DM, generalized weakness could be anginal 

equivalent


Pt admitted for glycemic control and cardiac evaluation

## 2020-02-06 LAB
ALBUMIN SERPL-MCNC: 3.1 G/DL (ref 3.4–5)
ALP SERPL-CCNC: 39 U/L (ref 45–117)
ALT SERPL-CCNC: 30 U/L (ref 13–61)
ANION GAP SERPL CALC-SCNC: 4 MMOL/L (ref 8–16)
AST SERPL-CCNC: 24 U/L (ref 15–37)
BILIRUB SERPL-MCNC: 0.5 MG/DL (ref 0.2–1)
BUN SERPL-MCNC: 12.1 MG/DL (ref 7–18)
CALCIUM SERPL-MCNC: 8.8 MG/DL (ref 8.5–10.1)
CHLORIDE SERPL-SCNC: 105 MMOL/L (ref 98–107)
CO2 SERPL-SCNC: 31 MMOL/L (ref 21–32)
CREAT SERPL-MCNC: 0.8 MG/DL (ref 0.55–1.3)
DEPRECATED RDW RBC AUTO: 16.3 % (ref 11.6–15.6)
GLUCOSE SERPL-MCNC: 142 MG/DL (ref 74–106)
HCT VFR BLD CALC: 41.7 % (ref 32.4–45.2)
HGB BLD-MCNC: 13.5 GM/DL (ref 10.7–15.3)
INR BLD: 3.41 (ref 0.83–1.09)
MCH RBC QN AUTO: 26.6 PG (ref 25.7–33.7)
MCHC RBC AUTO-ENTMCNC: 32.4 G/DL (ref 32–36)
MCV RBC: 82.2 FL (ref 80–96)
PLATELET # BLD AUTO: 156 K/MM3 (ref 134–434)
PMV BLD: 11.6 FL (ref 7.5–11.1)
POTASSIUM SERPLBLD-SCNC: 3.3 MMOL/L (ref 3.5–5.1)
PROT SERPL-MCNC: 6 G/DL (ref 6.4–8.2)
PT PNL PPP: 40.7 SEC (ref 9.7–13)
RBC # BLD AUTO: 5.07 M/MM3 (ref 3.6–5.2)
SODIUM SERPL-SCNC: 139 MMOL/L (ref 136–145)
WBC # BLD AUTO: 5.3 K/MM3 (ref 4–10)

## 2020-02-06 RX ADMIN — INSULIN ASPART SCH UNIT: 100 INJECTION, SOLUTION INTRAVENOUS; SUBCUTANEOUS at 16:41

## 2020-02-06 RX ADMIN — INSULIN DETEMIR SCH UNITS: 100 INJECTION, SOLUTION SUBCUTANEOUS at 21:09

## 2020-02-06 RX ADMIN — ATENOLOL SCH: 50 TABLET ORAL at 09:55

## 2020-02-06 RX ADMIN — INSULIN ASPART SCH: 100 INJECTION, SOLUTION INTRAVENOUS; SUBCUTANEOUS at 06:24

## 2020-02-06 RX ADMIN — ATORVASTATIN CALCIUM SCH MG: 40 TABLET, FILM COATED ORAL at 21:07

## 2020-02-06 RX ADMIN — INSULIN ASPART SCH UNIT: 100 INJECTION, SOLUTION INTRAVENOUS; SUBCUTANEOUS at 21:09

## 2020-02-06 RX ADMIN — GABAPENTIN SCH MG: 400 CAPSULE ORAL at 21:07

## 2020-02-06 RX ADMIN — GABAPENTIN SCH MG: 400 CAPSULE ORAL at 06:24

## 2020-02-06 RX ADMIN — SODIUM CHLORIDE SCH MLS/HR: 9 INJECTION, SOLUTION INTRAVENOUS at 21:15

## 2020-02-06 RX ADMIN — INSULIN ASPART SCH UNIT: 100 INJECTION, SOLUTION INTRAVENOUS; SUBCUTANEOUS at 12:11

## 2020-02-06 RX ADMIN — TIOTROPIUM BROMIDE AND OLODATEROL SCH PUFF: 3.124; 2.736 SPRAY, METERED RESPIRATORY (INHALATION) at 10:16

## 2020-02-06 RX ADMIN — GABAPENTIN SCH MG: 400 CAPSULE ORAL at 13:30

## 2020-02-06 RX ADMIN — INSULIN DETEMIR SCH UNITS: 100 INJECTION, SOLUTION SUBCUTANEOUS at 06:27

## 2020-02-06 RX ADMIN — ASPIRIN 81 MG SCH MG: 81 TABLET ORAL at 10:15

## 2020-02-06 RX ADMIN — SODIUM CHLORIDE SCH: 9 INJECTION, SOLUTION INTRAVENOUS at 03:30

## 2020-02-06 NOTE — ECHO
______________________________________________________________________________



Name: AUNDREA ESTEVEZ                                  Exam:Adult Echocardiogram

MRN: [id number]         Study Date: 2020 10:45 AM

Age: 55 yrs

______________________________________________________________________________



Reason For Study: ST Depressions

Height: 69 in        Weight: 221 lb        BSA: 2.2 m2



______________________________________________________________________________



MMode/2D Measurements & Calculations

IVSd: 0.95 cm                                           Ao root diam: 2.4 cm

LVIDd: 4.1 cm                                           LA dimension: 3.3 cm

LVIDs: 2.8 cm                                           ACS: 1.5 cm

LVPWd: 0.89 cm



_________________________________________________________

EDV(Teich): 75.3 ml                                     LVOT diam: 1.6 cm

ESV(Teich): 29.3 ml



_________________________________________________________

LAV (MOD-bp): 103.0 ml                                  TAPSE: 1.7 cm

                                                        RV S Dipesh: 11.7 cm/sec



Doppler Measurements & Calculations

                                                   MV E max dipesh: 143.0 cm/sec

MVA(VTI): 1.0 cm2                                  MV A max dipesh: 46.7 cm/sec

MV V2 max: 127.9 cm/sec                            MV E/A: 3.1

MV max P.6 mmHg                                MV dec time: 0.18 sec

MV V2 mean: 65.2 cm/sec

MV mean P.1 mmHg

MV V2 VTI: 27.3 cm

____________________________________________________

Ao V2 max: 125.5 cm/sec                            LV V1 max P.6 mmHg

Ao max P.3 mmHg                                LV V1 mean P.88 mmHg

Ao V2 mean: 85.9 cm/sec                            LV V1 max: 62.9 cm/sec

Ao mean PG: 3.3 mmHg                               LV V1 mean: 44.7 cm/sec

Ao V2 VTI: 28.1 cm                                 LV V1 VTI: 13.2 cm



ANGELES(I,D): 0.98 cm2

ANGELES(V,D): 1.1 cm2



____________________________________________________

SV(LVOT): 27.7 ml                                  TR max dipesh: 233.0 cm/sec

                                                   TR max P.9 mmHg



____________________________________________________

PA V2 max: 67.9 cm/sec                             Med Peak E' Dipesh: 7.8 cm/sec

PA max P.8 mmHg                                Med E/e': 18.3

PA acc time: 0.15 sec                              Lat Peak E' Dipesh: 7.4 cm/sec

                                                   Lat E/e': 19.3



____________________________________________________

PA pr(Accel): 10.9 mmHg





______________________________________________________________________________

Procedure

A complete two-dimensional transthoracic echocardiogram was performed (2D, M-mode, Doppler and color 
flow

Doppler).

Left Ventricle

The left ventricular size, thickness and function are normal. The left ventricular ejection fraction 
is

normal. Ejection Fraction = 55-60%. The left ventricular wall motion is normal.

Right Ventricle

The right ventricle is normal in size and function.

Atria

Normal left and right atrial size and function.

Mitral Valve

There is a mechanical mitral valve. The prosthetic mitral valve appears to open well. There is no leanne
ral

regurgitation noted.

Tricuspid Valve

There is trace tricuspid regurgitation. Right ventricular systolic pressure is normal.

Aortic Valve

No hemodynamically significant valvular aortic stenosis. No aortic regurgitation is present.

Pulmonic Valve

There is no pulmonic valvular regurgitation.

Great Vessels

The aortic root is normal size.

Pericardium/Pleura

There is no pericardial effusion.

______________________________________________________________________________





Interpretation Summary

The left ventricular size, thickness and function are normal

The right ventricle is normal in size and function.

There is a mechanical mitral valve.

There is trace tricuspid regurgitation.





MD Lorenzo Russell 2020 03:00 PM

## 2020-02-06 NOTE — PN
Progress Note (short form)





- Note


Progress Note: 





ID CONSULT DICTATED


UNCONTROLLED DM


LACTIC ACIDOSIS


S/P MVR


NO CLEAR INFECTIOUS FOCUS


CULTURES NEGATIVE


OBSERVE OFF ANTIBIOTICS

## 2020-02-06 NOTE — CON.CARD
Consult


Consult Specialty:: Cardiology


Referred by:: Kenyon


Reason for Consultation:: MVR, abnormal ECG





- History of Present Illness


Chief Complaint: hyperglycemia


History of Present Illness: 


55 f hx htn, hld, dchf, copd, cad s/p pci 2016, dm, mitral stenosis s/p 

mechanical MVR 7/23/19 with cryomaze, PRASANNA ablation on coumadin, PAF s/p PVI 10/3

/19 p/w hyperglycemia for last 4 weeks. Found with an abnormal ECG without 

symptoms. No chest pain, sob, orthopnea, PND or edema. 


cath 7/2/19 non obstructive CAD


echo at [facility] 10/19 nlef normal [facility] MV


Echo 2/6/20 normal EF, normal [facility] MV








- History Source


History Provided By: Patient, Medical Record





- Past Medical History


Cardio/Vascular: Yes: AFIB, CAD, CHF, HTN, Hyperlipdemia, Mitral Stenosis, 

Murmur


Pulmonary: Yes: COPD





- Past Surgical History


Past Surgical History: Yes: Stent





- Alcohol/Substance Use


Hx Alcohol Use: No


History of Substance Use: reports: None





- Smoking History


Smoking history: Never smoked


Have you smoked in the past 12 months: No





- Social History


ADL: Independent


History of Recent Travel: Yes





Home Medications





- Allergies


Allergies/Adverse Reactions: 


 Allergies











Allergy/AdvReac Type Severity Reaction Status Date / Time


 


doxycycline Allergy   Verified 02/04/20 14:15


 


erythromycin base Allergy   Verified 02/04/20 14:15


 


Penicillins Allergy   Verified 02/04/20 14:15


 


tetracycline Allergy   Verified 02/04/20 14:15














- Home Medications


Home Medications: 


Ambulatory Orders





Aspirin [ASA -] 81 mg PO DAILY 06/28/19 


Atorvastatin Ca [Lipitor] 80 mg PO HS 06/28/19 


Gabapentin 400 mg PO TID 06/28/19 


Paroxetine HCl [Paxil -] 10 mg PO DAILY 06/28/19 


Warfarin Sodium [Coumadin] 10 mg PO DAILY 06/28/19 


Atenolol/Chlorthalidone [Atenolol-Chlorthalidone 100-25] 1 each PO DAILY 02/04/ 20 


Dulaglutide [Trulicity] 0.75 mg SQ WEEKLY 02/04/20 


Tiotropium Br/Olodaterol HCl [Stiolto Respimat Inhal Spray] 1 puff PO PRN PRN 02 /04/20 


Warfarin Sodium 10 mg PO HS 02/04/20 


metFORMIN HCL [Metformin HCl] 1,000 mg PO BID 02/04/20 








Vital Signs: 


 Vital Signs











Temperature  97.9 F   02/06/20 09:50


 


Pulse Rate  74   02/06/20 09:50


 


Respiratory Rate  18   02/06/20 09:50


 


Blood Pressure  108/71   02/06/20 09:50


 


O2 Sat by Pulse Oximetry (%)  98   02/06/20 09:00














- Other Data


Labs, Other Data: 


 CBC, BMP





 02/06/20 06:08 





 02/06/20 06:08 





 INR, PTT











INR  3.41  (0.83-1.09)  H  02/06/20  06:15    














Imaging





- Results


Chest X-ray: Report Reviewed


EKG: Report Reviewed (nsr lvh repol)





Assessment/Plan


55 f hx htn, hld, dchf, copd, cad s/p pci 2016, dm, mitral stenosis s/p 

mechanical MVR 7/23/19 with cryomaze, PRASANNA ablation on coumadin, PAF s/p PVI 10/3

/19 p/w hyperglycemia for last 4 weeks. Found with an abnormal ECG without 

symptoms. No chest pain, sob, orthopnea, PND or edema. 


cath 7/2/19 non obstructive CAD


echo at [facility] 10/19 nlef normal [facility] MV





MVR


-continue AC for INR 2.5-3.5





PAF


-stable in NSR at present s/p PVI 10/3/19. 


-her blood pressure has been borderline.  Hold atenolol for now. Likely due to 

her glycemic status. 





Abnormal ECG


-no symptoms c/w ACS or CAD at present. 


-non obstructive CAD by cath 2019. No testing is needed.  


-dc telemetry.





call us with questions.

## 2020-02-06 NOTE — CONS
DATE OF CONSULTATION:  

 

DATE OF DICTATION:  02/06/2020

 

The patient is a 55-year-old diabetic female, evaluated for lactic acidosis.  She

presented to the hospital with a 2-day history of worsening generalized weakness,

nausea, and loose bowel movements, headache, and dizziness.  She reports having

uncontrolled blood sugars for the past several weeks.  She was found on admission to

have an elevated blood sugar and EKG changes.  She was placed on telemetry.  

 

The patient has a complicated cardiac history with mitral valve replacement in July of 2019, left atrial appendage ablation.  Her course was complicated by elevated

lactic acid at 2.2.  

 

She is awake and alert.  She has remained afebrile with a normal white blood cell

count.  She has no focal complaints.  She denies any chest pain, shortness of breath,

cough, or sputum production.  No recurrent diarrhea.  No dysuria or hematuria.  No

infected foot ulcers.  

 

PAST MEDICAL HISTORY:  Positive for hypertension and hyperlipidemia, diabetes

mellitus, congestive heart failure, coronary artery disease, atrial fibrillation,

COPD.

 

PAST SURGICAL HISTORY:  Status post mitral valve replacement.  

 

ALLERGIES:  DOXYCYCLINE, ERYTHROMYCIN, PENICILLIN, AND TETRACYCLINE. 

 

LABORATORY DATA:  White count 5.3, hematocrit 41.7, platelet count 156, creatinine

0.8, liver enzymes are normal.  Chest x-ray is negative.  Urine leukocyte esterase is

negative.  Blood cultures and urine cultures are negative.  

 

PHYSICAL EXAMINATION:

General:  She is out of bed to chair.  She is not acutely toxic appearing.  

Vital Signs:  Temperature 98.1, blood pressure 108/70, pulse 73 and regular,

respirations 18 per minute.

HEENT:  Sclerae anicteric.  

Neck:  Supple.  

Heart:  Sounds S1, S2.  

Lungs:  Clear.

Abdomen:  Soft, nontender.  

Extremities:  Negative for edema.  

 

IMPRESSION:  

1.  Uncontrolled diabetes mellitus.

2.  Lactic acidosis. 

3.  Status post mitral valve replacement.  

 

PLAN:  No clear infectious focus.  Cultures are negative.  Patient is afebrile with a

normal white blood cell count.  I advised observation off of antibiotic therapy.  

 

Thank you for the kind referral. 

 

 

ELIESER ALMONTE M.D.

 

CRISTIANO/[id number]

DD: 02/06/2020 18:25

DT: 02/06/2020 18:42

Job #:  [id number]

## 2020-02-07 VITALS — HEART RATE: 76 BPM | DIASTOLIC BLOOD PRESSURE: 70 MMHG | SYSTOLIC BLOOD PRESSURE: 110 MMHG

## 2020-02-07 VITALS — TEMPERATURE: 98.2 F

## 2020-02-07 LAB
ANION GAP SERPL CALC-SCNC: 5 MMOL/L (ref 8–16)
BUN SERPL-MCNC: 9.6 MG/DL (ref 7–18)
CALCIUM SERPL-MCNC: 8.7 MG/DL (ref 8.5–10.1)
CHLORIDE SERPL-SCNC: 105 MMOL/L (ref 98–107)
CO2 SERPL-SCNC: 28 MMOL/L (ref 21–32)
CREAT SERPL-MCNC: 1 MG/DL (ref 0.55–1.3)
GLUCOSE SERPL-MCNC: 430 MG/DL (ref 74–106)
INR BLD: 2.65 (ref 0.83–1.09)
MAGNESIUM SERPL-MCNC: 1.6 MG/DL (ref 1.8–2.4)
POTASSIUM SERPLBLD-SCNC: 3.8 MMOL/L (ref 3.5–5.1)
PT PNL PPP: 31.6 SEC (ref 9.7–13)
SODIUM SERPL-SCNC: 139 MMOL/L (ref 136–145)

## 2020-02-07 RX ADMIN — GABAPENTIN SCH MG: 400 CAPSULE ORAL at 06:25

## 2020-02-07 RX ADMIN — INSULIN ASPART SCH UNIT: 100 INJECTION, SOLUTION INTRAVENOUS; SUBCUTANEOUS at 06:30

## 2020-02-07 RX ADMIN — INSULIN ASPART SCH UNIT: 100 INJECTION, SOLUTION INTRAVENOUS; SUBCUTANEOUS at 11:29

## 2020-02-07 RX ADMIN — SODIUM CHLORIDE SCH MLS/HR: 9 INJECTION, SOLUTION INTRAVENOUS at 06:25

## 2020-02-07 RX ADMIN — ASPIRIN 81 MG SCH MG: 81 TABLET ORAL at 09:35

## 2020-02-07 RX ADMIN — ATENOLOL SCH MG: 50 TABLET ORAL at 11:31

## 2020-02-07 RX ADMIN — TIOTROPIUM BROMIDE AND OLODATEROL SCH PUFF: 3.124; 2.736 SPRAY, METERED RESPIRATORY (INHALATION) at 09:35

## 2020-02-07 NOTE — DS
Physical Examination


Vital Signs: 


 Vital Signs











Temperature  98.0 F   02/07/20 05:57


 


Pulse Rate  77   02/07/20 05:57


 


Respiratory Rate  20   02/07/20 05:57


 


Blood Pressure  95/65   02/07/20 05:57


 


O2 Sat by Pulse Oximetry (%)  99   02/06/20 20:41











Findings/Remarks: 





AWAKE ALERT FEELING BETTER


Constitutional: Yes: No Distress


Eyes: Yes: WNL


HENT: Yes: WNL


Neck: Yes: WNL


Cardiovascular: Yes: Pulse Irregular


Respiratory: Yes: WNL


Gastrointestinal: Yes: WNL


Musculoskeletal: Yes: WNL


Extremities: Yes: WNL


Edema: No


Integumentary: Yes: WNL


Wound/Incision: Yes: Clean/Dry


Neurological: Yes: WNL


...Motor Strength: WNL


Psychiatric: Yes: WNL


Labs: 


 CBC, BMP





 02/06/20 06:08 











Discharge Summary


Problems reviewed: Yes


Reason For Visit: HIGH GLUCOSE LEVEL


Current Active Problems





SUNNI (acute kidney injury) (Acute)


Abnormal EKG (Acute)


HLD (hyperlipidemia) (Acute)


HTN (hypertension) (Acute)


High glucose (Acute)


Uncontrolled diabetes mellitus (Acute)








Procedures: Principal: ECHO


Other Procedures: LABS/CX


Hospital Course: 


ADMITTED FOR CARDIAC AND RESPIRATORY WORKUP, ECHO DONE NO ACUTE CHANGES, A1C% 

ELEVATED, ENDOCRINE WORKUP DONE


NUTRITION MENU GIVEN, STARTED INSULIN AND WILL NEED TO SEE ME IN 2 DAYS FOR 

LABS AND FOLLOW UP.


Plan of Treatment: 


CHECK YOUR BLOOD SUGAR 3 X DAY, SEE DR LARKIN IN 2 DAYS


[facility] SENT FOR DIABETES EDUCATION AND MED FOLLOW UP





Condition: Stable





- Instructions


Diet, Activity, Other Instructions: 


[facility] SENT FOR BGM CHECKS AND EDUCATION


SEE DR LARKIN IN 2 DAYS\


[phone]


[address] 2PM MONDAY FEB 10TH


Referrals: 


Susy Luevano [Primary Care Provider] - 


Disposition: VNS/HOME HEALTH CARE





- Home Medications


Comprehensive Discharge Medication List: 


Ambulatory Orders





Aspirin [ASA -] 81 mg PO DAILY 06/28/19 


Atorvastatin Ca [Lipitor] 80 mg PO HS 06/28/19 


Gabapentin 400 mg PO TID 06/28/19 


Paroxetine HCl [Paxil -] 10 mg PO DAILY 06/28/19 


Warfarin Sodium [Coumadin] 10 mg PO DAILY 06/28/19 


Atenolol/Chlorthalidone [Atenolol-Chlorthalidone 100-25] 1 each PO DAILY 02/04/ 20 


Dulaglutide [Trulicity] 0.75 mg SQ WEEKLY 02/04/20 


Warfarin Sodium 10 mg PO HS 02/04/20 


metFORMIN HCL [Metformin HCl] 1,000 mg PO BID 02/04/20 


Albuterol 0.083% Nebulizer Sol [Ventolin 0.083% Nebulizer Soln -] 1 amp NEB Q6H 

PRN #120 amp 02/07/20 


Insulin Glargine,Hum.rec.anlog [Basaglar Kwikpen U-100] 100 unit SQ BID #4 

insuln.pen 02/07/20 


Insulin Lispro [Humalog Kwikpen U-200] 200 unit SQ ACHS #4 insuln.pen 02/07/20 


Tiotropium Br/Olodaterol HCl [Stiolto Respimat Inhal Spray] 1 puff PO PRN PRN #

1 mist.inhal 02/07/20

## 2020-03-31 NOTE — PN
Progress Note, Physician


Chief Complaint: 





AWAKE ALERT


EVENTS REVIEWED





- Current Medication List


Current Medications: 


Active Medications





Albuterol Sulfate (Ventolin 0.083% Nebulizer Soln -)  1 amp NEB Q6H PRN


   PRN Reason: SHORT OF BREATH/WHEEZING


Aspirin (Asa -)  81 mg PO DAILY [facility]


   Last Admin: 02/06/20 10:15 Dose:  81 mg


Atenolol (Tenormin -)  100 mg PO DAILY [facility]


   Last Admin: 02/06/20 09:55 Dose:  Not Given


Atorvastatin Calcium (Lipitor -)  80 mg PO HS [facility]


   Last Admin: 02/05/20 22:04 Dose:  80 mg


Gabapentin (Neurontin -)  400 mg PO TID [facility]


   Last Admin: 02/06/20 06:24 Dose:  400 mg


Sodium Chloride (Normal Saline -)  1,000 mls @ 100 mls/hr IV ASDIR [facility]


   Last Admin: 02/06/20 03:30 Dose:  Not Given


Insulin Aspart (Novolog Vial Sliding Scale -)  1 vial SQ ACHS [facility]; Protocol


   Last Admin: 02/06/20 06:24 Dose:  Not Given


Insulin Detemir (Levemir Vial)  20 units SQ BID@0700,2200 [facility]


   Last Admin: 02/06/20 06:27 Dose:  20 units


Paroxetine HCl (Paxil -)  10 mg PO DAILY [facility]


   Last Admin: 02/06/20 10:15 Dose:  10 mg


Potassium Chloride (K-Dur -)  20 meq PO ONCE ONE


   Stop: 02/06/20 11:39


Tiotropium Bromide/Olodaterol (Stiolto Respimat Inhal Spray)  1 puff IH DAILY 

[facility]


   Last Admin: 02/06/20 10:16 Dose:  1 puff


Warfarin Sodium (Coumadin -)  10 mg PO DAILY@1800 [facility]


   Last Admin: 02/05/20 17:28 Dose:  10 mg











- Objective


Vital Signs: 


 Vital Signs











Temperature  97.9 F   02/06/20 09:50


 


Pulse Rate  74   02/06/20 09:50


 


Respiratory Rate  18   02/06/20 09:50


 


Blood Pressure  108/71   02/06/20 09:50


 


O2 Sat by Pulse Oximetry (%)  98   02/05/20 21:00











Constitutional: Yes: Mild Distress


Cardiovascular: Yes: Regular Rate and Rhythm, Murmur


Respiratory: Yes: CTA Bilaterally


Gastrointestinal: Yes: Soft, Abdomen, Obese


Genitourinary: Yes: WNL


Musculoskeletal: Yes: WNL


Extremities: Yes: WNL


Edema: Yes


Edema: LLE: Trace, RLE: Trace


Neurological: Yes: WNL


Labs: 


 CBC, BMP





 02/06/20 06:08 





 02/06/20 06:08 





 INR, PTT











INR  3.41  (0.83-1.09)  H  02/06/20  06:15    














Problem List





- Problems


(1) SUNNI (acute kidney injury)


Code(s): N17.9 - ACUTE KIDNEY FAILURE, UNSPECIFIED   





(2) Abnormal EKG


Code(s): R94.31 - ABNORMAL ELECTROCARDIOGRAM [ECG] [EKG]   





(3) HLD (hyperlipidemia)


Code(s): E78.5 - HYPERLIPIDEMIA, UNSPECIFIED   





(4) HTN (hypertension)


Code(s): I10 - ESSENTIAL (PRIMARY) HYPERTENSION   





(5) High glucose


Code(s): R73.09 - OTHER ABNORMAL GLUCOSE   





(6) Uncontrolled diabetes mellitus


Code(s): E11.65 - TYPE 2 DIABETES MELLITUS WITH HYPERGLYCEMIA   





(7) Anemia


Code(s): D64.9 - ANEMIA, UNSPECIFIED   





(8) CAD (coronary artery disease)


Code(s): I25.10 - ATHSCL HEART DISEASE OF NATIVE CORONARY ARTERY W/O ANG PCTRS 

  





(9) COPD (chronic obstructive pulmonary disease)


Code(s): J44.9 - CHRONIC OBSTRUCTIVE PULMONARY DISEASE, UNSPECIFIED   





(10) Mitral valve stenosis


Code(s): I05.0 - RHEUMATIC MITRAL STENOSIS   





Assessment/Plan





DIABETES CONTROL DR [facility] CONSULT


CAD WORKUP, ECHO SCHEDULED FOR TODAY


SEES A CARDIOLOGIST AT [facility] I SPOKE WITH DR MARTINEZ AND CANCELED 

HER 


CONSULT AND ASKED DR PORTILLO TO SEE THE PATIENT WHO COVERS [facility] PATIENTS.


[facility]


REPLETE [facility]


OOB TO CHAIR


HOLD COUMADIN TODAY INR SUPRATHERAPEUTIC Received call from patient. Reports he will be starting eliquis. He is doing well today. Writer reviewed will send med chart to patient via mail as this will help him understand and know what medications he is taking and for what. He is appreciative of writers assistance. No other offers concerned at this time. Will check in with patient tomorrow. Patient is agreeable.    Faby Rivera MSN, APNP, AGPCNP-BC, [facility]  Palliative Care  T: [phone]